# Patient Record
Sex: MALE | Race: WHITE | NOT HISPANIC OR LATINO | Employment: FULL TIME | URBAN - METROPOLITAN AREA
[De-identification: names, ages, dates, MRNs, and addresses within clinical notes are randomized per-mention and may not be internally consistent; named-entity substitution may affect disease eponyms.]

---

## 2017-08-24 DIAGNOSIS — M25.561 RIGHT KNEE PAIN, UNSPECIFIED CHRONICITY: Primary | ICD-10-CM

## 2017-08-25 ENCOUNTER — HOSPITAL ENCOUNTER (OUTPATIENT)
Dept: RADIOLOGY | Facility: HOSPITAL | Age: 62
Discharge: HOME | End: 2017-08-25
Attending: ORTHOPAEDIC SURGERY
Payer: COMMERCIAL

## 2017-08-25 ENCOUNTER — OFFICE VISIT (OUTPATIENT)
Dept: ORTHOPEDICS | Facility: CLINIC | Age: 62
End: 2017-08-25
Payer: COMMERCIAL

## 2017-08-25 VITALS — HEIGHT: 75 IN | BODY MASS INDEX: 32.7 KG/M2 | WEIGHT: 263 LBS

## 2017-08-25 DIAGNOSIS — M25.561 RIGHT KNEE PAIN, UNSPECIFIED CHRONICITY: Primary | ICD-10-CM

## 2017-08-25 DIAGNOSIS — M25.561 RIGHT KNEE PAIN, UNSPECIFIED CHRONICITY: ICD-10-CM

## 2017-08-25 PROCEDURE — 73560 X-RAY EXAM OF KNEE 1 OR 2: CPT | Mod: 26,LT,, | Performed by: RADIOLOGY

## 2017-08-25 PROCEDURE — 99999 PR PBB SHADOW E&M-EST. PATIENT-LVL II: CPT | Mod: PBBFAC,,, | Performed by: ORTHOPAEDIC SURGERY

## 2017-08-25 PROCEDURE — 3008F BODY MASS INDEX DOCD: CPT | Mod: S$GLB,,, | Performed by: ORTHOPAEDIC SURGERY

## 2017-08-25 PROCEDURE — 73560 X-RAY EXAM OF KNEE 1 OR 2: CPT | Mod: TC,PO,LT

## 2017-08-25 PROCEDURE — 73562 X-RAY EXAM OF KNEE 3: CPT | Mod: 26,RT,, | Performed by: RADIOLOGY

## 2017-08-25 PROCEDURE — 99203 OFFICE O/P NEW LOW 30 MIN: CPT | Mod: 25,S$GLB,, | Performed by: ORTHOPAEDIC SURGERY

## 2017-08-25 PROCEDURE — 97760 ORTHOTIC MGMT&TRAING 1ST ENC: CPT | Mod: S$GLB,,, | Performed by: ORTHOPAEDIC SURGERY

## 2017-08-25 RX ORDER — ESCITALOPRAM OXALATE 10 MG/1
TABLET ORAL
Refills: 11 | COMMUNITY
Start: 2017-08-10 | End: 2021-02-18 | Stop reason: SDUPTHER

## 2017-08-25 RX ORDER — GLUCOSAMINE/CHONDRO SU A 500-400 MG
1 TABLET ORAL 3 TIMES DAILY
COMMUNITY

## 2017-08-25 RX ORDER — MELOXICAM 15 MG/1
TABLET ORAL
Refills: 3 | COMMUNITY
Start: 2017-07-12 | End: 2024-03-25

## 2017-08-25 RX ORDER — CLOBETASOL PROPIONATE 0.5 MG/G
CREAM TOPICAL
Refills: 3 | COMMUNITY
Start: 2017-07-12 | End: 2021-02-09

## 2017-08-25 NOTE — PROGRESS NOTES
Mr. Montoya is a 62-year-old complaining of pain in his right knee for about   three days' time.  He spent a long day standing at the [employer] on   [place], which is where he works and then later that day, he did a lot of   mowing of grass where he had to push a lawnmower, noticed pain in his knee   thereafter, feels like his knee wants to give out on and feels fragile, pain   with twisting motions.  Pain is 3/10 on the pain scale on bad days, 0/10 on good   days.    Exam shows full motion, good strength.  Does have joint line tenderness.  No   signs of infection.  No instability.    X-rays show well preserved joint spacing.    ASSESSMENT:  Knee strain chondral injury with knee pain x3 days.    PLAN:  Knee brace, symptomatic care, relative rest.  Follow up in a few weeks'   time to see how things are coming along.      PBB/PN  dd: 08/25/2017 09:42:54 (CDT)  td: 08/25/2017 11:55:07 (CDT)  Doc ID   #[id number]  Job ID #[id number]    CC:     Further History  Aching pain  Worse with activity  Relieved with rest  No other associated symptoms  No other radiation    Further Exam  Alert and oriented  Pleasant  Contralateral limb has appropriate range of motion for age and condition  Contralateral limb has appropriate strength for age and condition  Contralateral limb has appropriate stability  for age and condition  No adenopathy  Pulses are appropriate for current condition  Skin is intact        Chief Complaint    Chief Complaint   Patient presents with    Right Knee - Injury, Pain       HPI  Hank Montoya is a 62 y.o.  male who presents with       Past Medical History  No past medical history on file.    Past Surgical History  No past surgical history on file.    Medications  Current Outpatient Prescriptions   Medication Sig    escitalopram oxalate (LEXAPRO) 10 MG tablet TK 1 T PO  QAM    glucosamine-chondroitin 500-400 mg tablet Take 1 tablet by mouth 3 (three) times daily.    meloxicam (MOBIC) 15 MG tablet  TK 1 T PO QAM AFTER A MEAL FOR FOOT PAIN    clobetasol (TEMOVATE) 0.05 % cream CHRIST AA QD     No current facility-administered medications for this visit.        Allergies  Review of patient's allergies indicates:  No Known Allergies    Family History  No family history on file.    Social History  Social History     Social History    Marital status:      Spouse name: N/A    Number of children: N/A    Years of education: N/A     Occupational History    Not on file.     Social History Main Topics    Smoking status: Never Smoker    Smokeless tobacco: Never Used    Alcohol use Not on file    Drug use: Unknown    Sexual activity: Not on file     Other Topics Concern    Not on file     Social History Narrative    No narrative on file               Review of Systems     Constitutional: Negative    HENT: Negative  Eyes: Negative  Respiratory: Negative  Cardiovascular: Negative  Musculoskeletal: HPI  Skin: Negative  Neurological: Negative  Hematological: Negative  Endocrine: Negative                 Physical Exam    There were no vitals filed for this visit.  Body mass index is 32.87 kg/m².  Physical Examination:     General appearance -  well appearing, and in no distress  Mental status - awake  Neck - supple  Chest -  symmetric air entry  Heart - normal rate   Abdomen - soft      Assessment     1. Right knee pain, unspecified chronicity          PlanWe performed a custom orthotic/brace fitting, adjusting and training with the patient. The patient demonstrated understanding and proper care. This was performed for 15 minutes.

## 2019-10-31 LAB
CHOL/HDLC RATIO: 3.9
CHOLEST SERPL-MSCNC: 155 MG/DL (ref 0–200)
HDLC SERPL-MCNC: 40 MG/DL
LDLC SERPL CALC-MCNC: 84 MG/DL (ref 0–160)
NONHDLC SERPL-MCNC: 115 MG/DL
TRIGL SERPL-MCNC: 226 MG/DL (ref 40–160)

## 2019-11-25 ENCOUNTER — TELEPHONE (OUTPATIENT)
Dept: CARDIOLOGY | Facility: CLINIC | Age: 64
End: 2019-11-25

## 2019-11-25 NOTE — TELEPHONE ENCOUNTER
----- Message from Vanessa Laureano sent at 11/25/2019  9:00 AM CST -----  Contact: PT  Type:  Sooner Apoointment Request    Caller is requesting a sooner appointment.  Caller declined first available appointment listed below.  Caller will not accept being placed on the waitlist and is requesting a message be sent to doctor.    Name of Caller:  Pt  When is the first available appointment?  01/07/2019  Symptoms: second opinion  Best Call Back Number:  [phone]  Additional Information:  Please Advise ---Thank you

## 2019-12-17 ENCOUNTER — OFFICE VISIT (OUTPATIENT)
Dept: CARDIOLOGY | Facility: CLINIC | Age: 64
End: 2019-12-17
Payer: COMMERCIAL

## 2019-12-17 VITALS
DIASTOLIC BLOOD PRESSURE: 81 MMHG | SYSTOLIC BLOOD PRESSURE: 119 MMHG | BODY MASS INDEX: 34.43 KG/M2 | HEIGHT: 74 IN | HEART RATE: 55 BPM | WEIGHT: 268.31 LBS

## 2019-12-17 DIAGNOSIS — E78.5 DYSLIPIDEMIA: ICD-10-CM

## 2019-12-17 DIAGNOSIS — Z91.89 AT RISK FOR CORONARY ARTERY DISEASE: ICD-10-CM

## 2019-12-17 DIAGNOSIS — R94.31 ABNORMAL ECG: ICD-10-CM

## 2019-12-17 PROCEDURE — 99203 OFFICE O/P NEW LOW 30 MIN: CPT | Mod: S$GLB,,, | Performed by: INTERNAL MEDICINE

## 2019-12-17 PROCEDURE — 99999 PR PBB SHADOW E&M-EST. PATIENT-LVL III: ICD-10-PCS | Mod: PBBFAC,,, | Performed by: INTERNAL MEDICINE

## 2019-12-17 PROCEDURE — 99999 PR PBB SHADOW E&M-EST. PATIENT-LVL III: CPT | Mod: PBBFAC,,, | Performed by: INTERNAL MEDICINE

## 2019-12-17 PROCEDURE — 99203 PR OFFICE/OUTPT VISIT, NEW, LEVL III, 30-44 MIN: ICD-10-PCS | Mod: S$GLB,,, | Performed by: INTERNAL MEDICINE

## 2019-12-17 RX ORDER — GABAPENTIN 300 MG/1
300 CAPSULE ORAL 2 TIMES DAILY
COMMUNITY
End: 2021-02-18 | Stop reason: SDUPTHER

## 2019-12-17 RX ORDER — PRAVASTATIN SODIUM 10 MG/1
10 TABLET ORAL DAILY
COMMUNITY
End: 2021-02-18 | Stop reason: SDUPTHER

## 2019-12-17 RX ORDER — METFORMIN HYDROCHLORIDE 500 MG/1
500 TABLET ORAL 2 TIMES DAILY WITH MEALS
COMMUNITY
End: 2021-02-18 | Stop reason: SDUPTHER

## 2019-12-17 RX ORDER — FENOFIBRATE 160 MG/1
160 TABLET ORAL DAILY
COMMUNITY
End: 2021-02-18 | Stop reason: SDUPTHER

## 2019-12-17 NOTE — PROGRESS NOTES
Subjective:    Patient ID:  Hank Montoya is a 64 y.o. male who presents for evaluation of [facility] Care      HPI  Here to establish/transfer care from  concerning DLP/elevated glucose/abn ECG. 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 Mild increase in READ vs 4 months ago. No CP.     Review of Systems   Constitution: Negative for malaise/fatigue.   Eyes: Negative for blurred vision.   Cardiovascular: Negative for chest pain, claudication, cyanosis, dyspnea on exertion, irregular heartbeat, leg swelling, near-syncope, orthopnea, palpitations, paroxysmal nocturnal dyspnea and syncope.   Respiratory: Negative for cough and shortness of breath.    Hematologic/Lymphatic: Does not bruise/bleed easily.   Musculoskeletal: Negative for back pain, falls, joint pain, muscle cramps, muscle weakness and myalgias.   Gastrointestinal: Negative for abdominal pain, change in bowel habit, nausea and vomiting.   Genitourinary: Negative for urgency.   Neurological: Negative for dizziness, focal weakness and light-headedness.       History reviewed. No pertinent past medical history.       Objective:     Vitals:    12/17/19 0912   BP: 119/81   Pulse: (!) 55        Physical Exam   Constitutional: He is oriented to person, place, and time. He appears well-developed and well-nourished. He is cooperative.   HENT:   Head: Normocephalic and atraumatic.   Eyes: Conjunctivae are normal. Right eye exhibits no exudate. Left eye exhibits no exudate.   Neck: Normal range of motion. Neck supple. Normal carotid pulses and no JVD present. Carotid bruit is not present. No thyromegaly present.   Cardiovascular: Normal rate, regular rhythm, normal heart sounds and intact distal pulses.   Pulses:       Carotid pulses are 2+ on the right side, and 2+ on the left side.       Radial pulses are 2+ on the right side, and 2+ on the left side.        Dorsalis pedis pulses are 2+ on the right side, and 2+ on the left side.        Posterior tibial pulses  are 2+ on the right side, and 2+ on the left side.   Pulmonary/Chest: Effort normal and breath sounds normal.   Abdominal: Soft. Bowel sounds are normal.   Musculoskeletal: Normal range of motion. He exhibits no edema.   Neurological: He is alert and oriented to person, place, and time. Gait normal.   Skin: Skin is warm, dry and intact. No cyanosis. Nails show no clubbing.   Psychiatric: He has a normal mood and affect. His speech is normal and behavior is normal. Judgment and thought content normal.   Nursing note and vitals reviewed.          [facility] 10/19  tg 226 ldl 84 hdl 40  hba1c 6.5  ..    Chemistry        Component Value Date/Time     07/21/2006 1208    K 3.8 07/21/2006 1208     07/21/2006 1208    CO2 26 07/21/2006 1208    BUN 12 07/21/2006 1208    CREATININE 1.1 07/21/2006 1208    GLU 88 07/21/2006 1208        Component Value Date/Time    CALCIUM 9.5 07/21/2006 1208    ALKPHOS 80 07/21/2006 1208    AST 19 07/21/2006 1208    ALT 21 07/21/2006 1208    BILITOT 0.4 07/21/2006 1208            ..  Lab Results   Component Value Date    CHOL 164 07/21/2006    CHOL 163 04/08/2004     Lab Results   Component Value Date    HDL 37.0 (L) 07/21/2006    HDL 32.0 (L) 04/08/2004     Lab Results   Component Value Date    LDLCALC 87.8 07/21/2006    LDLCALC 88.8 04/08/2004     Lab Results   Component Value Date    TRIG 196 (H) 07/21/2006    TRIG 211 (H) 04/08/2004     Lab Results   Component Value Date    CHOLHDL 22.6 07/21/2006    CHOLHDL 19.6 (L) 04/08/2004     ..  Lab Results   Component Value Date    WBC 5.89 07/21/2006    HGB 16.4 07/21/2006    HCT 47.1 07/21/2006    MCV 95.7 (H) 07/21/2006     07/21/2006       Test(s) Reviewed  I have reviewed the following in detail:  [] Stress test   [] Angiography   [x] Echocardiogram   [x] Labs   [x] Other:       Assessment:         ICD-10-CM ICD-9-CM   1. Dyslipidemia E78.5 272.4   2. Abnormal ECG R94.31 794.31     Problem List Items Addressed This Visit      Dyslipidemia    Abnormal ECG    Overview     Lateral t wave inversion since 2015                  Plan:           Return to clinic 4 months   Low level/low impact aerobic exercise 5x's/wk. Heart healthy diet and risk factor modification.    See labs and med orders.  Orders Placed This Encounter   Procedures    CT Calcium Scoring Cardiac    Comprehensive metabolic panel    TSH    T4, free    Lipid panel    CBC auto differential    Echo       Portions of this note may have been created with voice recognition software.  Grammatical, syntax and spelling errors may be inevitable.

## 2019-12-24 ENCOUNTER — CLINICAL SUPPORT (OUTPATIENT)
Dept: CARDIOLOGY | Facility: CLINIC | Age: 64
End: 2019-12-24
Attending: INTERNAL MEDICINE
Payer: COMMERCIAL

## 2019-12-24 ENCOUNTER — HOSPITAL ENCOUNTER (OUTPATIENT)
Dept: RADIOLOGY | Facility: HOSPITAL | Age: 64
Discharge: HOME | End: 2019-12-24
Attending: INTERNAL MEDICINE
Payer: COMMERCIAL

## 2019-12-24 VITALS — HEART RATE: 50 BPM | BODY MASS INDEX: 34.39 KG/M2 | HEIGHT: 74 IN | WEIGHT: 268 LBS

## 2019-12-24 DIAGNOSIS — E78.5 DYSLIPIDEMIA: ICD-10-CM

## 2019-12-24 DIAGNOSIS — R94.31 ABNORMAL ECG: ICD-10-CM

## 2019-12-24 DIAGNOSIS — Z91.89 AT RISK FOR CORONARY ARTERY DISEASE: ICD-10-CM

## 2019-12-24 LAB
ASCENDING AORTA: 3.05 CM
AV INDEX (PROSTH): 0.75
AV MEAN GRADIENT: 6 MMHG
AV PEAK GRADIENT: 10 MMHG
AV VALVE AREA: 2.45 CM2
AV VELOCITY RATIO: 0.78
BSA FOR ECHO PROCEDURE: 2.52 M2
CV ECHO LV RWT: 0.36 CM
DOP CALC AO PEAK VEL: 1.56 M/S
DOP CALC AO VTI: 40.27 CM
DOP CALC LVOT AREA: 3.3 CM2
DOP CALC LVOT DIAMETER: 2.04 CM
DOP CALC LVOT PEAK VEL: 1.21 M/S
DOP CALC LVOT STROKE VOLUME: 98.72 CM3
DOP CALCLVOT PEAK VEL VTI: 30.22 CM
E WAVE DECELERATION TIME: 269.01 MSEC
E/A RATIO: 1.08
E/E' RATIO: 10.12 M/S
ECHO LV POSTERIOR WALL: 0.96 CM (ref 0.6–1.1)
FRACTIONAL SHORTENING: 52 % (ref 28–44)
INTERVENTRICULAR SEPTUM: 1.02 CM (ref 0.6–1.1)
IVRT: 0.11 MSEC
LA MAJOR: 5.45 CM
LA MINOR: 5.81 CM
LA WIDTH: 3.63 CM
LEFT ATRIUM SIZE: 3.44 CM
LEFT ATRIUM VOLUME INDEX: 24.3 ML/M2
LEFT ATRIUM VOLUME: 59.7 CM3
LEFT INTERNAL DIMENSION IN SYSTOLE: 2.57 CM (ref 2.1–4)
LEFT VENTRICLE DIASTOLIC VOLUME INDEX: 56.25 ML/M2
LEFT VENTRICLE DIASTOLIC VOLUME: 138.44 ML
LEFT VENTRICLE MASS INDEX: 82 G/M2
LEFT VENTRICLE SYSTOLIC VOLUME INDEX: 9.7 ML/M2
LEFT VENTRICLE SYSTOLIC VOLUME: 23.83 ML
LEFT VENTRICULAR INTERNAL DIMENSION IN DIASTOLE: 5.35 CM (ref 3.5–6)
LEFT VENTRICULAR MASS: 200.86 G
LV LATERAL E/E' RATIO: 8.6 M/S
LV SEPTAL E/E' RATIO: 12.29 M/S
MV PEAK A VEL: 0.8 M/S
MV PEAK E VEL: 0.86 M/S
PISA TR MAX VEL: 1.96 M/S
PULM VEIN S/D RATIO: 1.32
PV PEAK D VEL: 0.41 M/S
PV PEAK S VEL: 0.54 M/S
RA MAJOR: 5.59 CM
RA PRESSURE: 3 MMHG
RA WIDTH: 3.45 CM
RIGHT VENTRICULAR END-DIASTOLIC DIMENSION: 4.54 CM
SINUS: 3.33 CM
STJ: 3.23 CM
TDI LATERAL: 0.1 M/S
TDI SEPTAL: 0.07 M/S
TDI: 0.09 M/S
TR MAX PG: 15 MMHG
TRICUSPID ANNULAR PLANE SYSTOLIC EXCURSION: 2.04 CM
TV REST PULMONARY ARTERY PRESSURE: 18 MMHG

## 2019-12-24 PROCEDURE — 99999 PR PBB SHADOW E&M-EST. PATIENT-LVL I: CPT | Mod: PBBFAC,,,

## 2019-12-24 PROCEDURE — 93306 ECHO (CUPID ONLY): ICD-10-PCS | Mod: S$GLB,,, | Performed by: INTERNAL MEDICINE

## 2019-12-24 PROCEDURE — 75571 CT HRT W/O DYE W/CA TEST: CPT | Mod: 26,,, | Performed by: RADIOLOGY

## 2019-12-24 PROCEDURE — 75571 CT CALCIUM SCORING CARDIAC: ICD-10-PCS | Mod: 26,,, | Performed by: RADIOLOGY

## 2019-12-24 PROCEDURE — 93306 TTE W/DOPPLER COMPLETE: CPT | Mod: S$GLB,,, | Performed by: INTERNAL MEDICINE

## 2019-12-24 PROCEDURE — 75571 CT HRT W/O DYE W/CA TEST: CPT | Mod: TC,PO

## 2019-12-24 PROCEDURE — 99999 PR PBB SHADOW E&M-EST. PATIENT-LVL I: ICD-10-PCS | Mod: PBBFAC,,,

## 2019-12-26 ENCOUNTER — PATIENT MESSAGE (OUTPATIENT)
Dept: CARDIOLOGY | Facility: CLINIC | Age: 64
End: 2019-12-26

## 2019-12-31 ENCOUNTER — PATIENT MESSAGE (OUTPATIENT)
Dept: CARDIOLOGY | Facility: CLINIC | Age: 64
End: 2019-12-31

## 2020-02-06 ENCOUNTER — PATIENT OUTREACH (OUTPATIENT)
Dept: ADMINISTRATIVE | Facility: HOSPITAL | Age: 65
End: 2020-02-06

## 2020-03-09 ENCOUNTER — PATIENT MESSAGE (OUTPATIENT)
Dept: CARDIOLOGY | Facility: CLINIC | Age: 65
End: 2020-03-09

## 2020-05-21 ENCOUNTER — LAB VISIT (OUTPATIENT)
Dept: PRIMARY CARE CLINIC | Facility: CLINIC | Age: 65
End: 2020-05-21
Payer: COMMERCIAL

## 2020-05-21 DIAGNOSIS — R05.9 COUGH: Primary | ICD-10-CM

## 2020-05-21 PROCEDURE — U0003 INFECTIOUS AGENT DETECTION BY NUCLEIC ACID (DNA OR RNA); SEVERE ACUTE RESPIRATORY SYNDROME CORONAVIRUS 2 (SARS-COV-2) (CORONAVIRUS DISEASE [COVID-19]), AMPLIFIED PROBE TECHNIQUE, MAKING USE OF HIGH THROUGHPUT TECHNOLOGIES AS DESCRIBED BY CMS-2020-01-R: HCPCS

## 2020-05-22 LAB — SARS-COV-2 RNA RESP QL NAA+PROBE: NOT DETECTED

## 2020-06-12 ENCOUNTER — LAB VISIT (OUTPATIENT)
Dept: LAB | Facility: HOSPITAL | Age: 65
End: 2020-06-12
Attending: INTERNAL MEDICINE
Payer: COMMERCIAL

## 2020-06-12 DIAGNOSIS — Z91.89 AT RISK FOR CORONARY ARTERY DISEASE: ICD-10-CM

## 2020-06-12 DIAGNOSIS — E78.5 DYSLIPIDEMIA: ICD-10-CM

## 2020-06-12 DIAGNOSIS — R94.31 ABNORMAL ECG: ICD-10-CM

## 2020-06-12 LAB
ALBUMIN SERPL BCP-MCNC: 3.9 G/DL (ref 3.5–5.2)
ALP SERPL-CCNC: 66 U/L (ref 55–135)
ALT SERPL W/O P-5'-P-CCNC: 23 U/L (ref 10–44)
ANION GAP SERPL CALC-SCNC: 11 MMOL/L (ref 8–16)
AST SERPL-CCNC: 21 U/L (ref 10–40)
BASOPHILS # BLD AUTO: 0.03 K/UL (ref 0–0.2)
BASOPHILS NFR BLD: 0.4 % (ref 0–1.9)
BILIRUB SERPL-MCNC: 0.5 MG/DL (ref 0.1–1)
BUN SERPL-MCNC: 16 MG/DL (ref 8–23)
CALCIUM SERPL-MCNC: 9.8 MG/DL (ref 8.7–10.5)
CHLORIDE SERPL-SCNC: 106 MMOL/L (ref 95–110)
CHOLEST SERPL-MCNC: 165 MG/DL (ref 120–199)
CHOLEST/HDLC SERPL: 3.9 {RATIO} (ref 2–5)
CO2 SERPL-SCNC: 24 MMOL/L (ref 23–29)
CREAT SERPL-MCNC: 1.2 MG/DL (ref 0.5–1.4)
DIFFERENTIAL METHOD: ABNORMAL
EOSINOPHIL # BLD AUTO: 0.1 K/UL (ref 0–0.5)
EOSINOPHIL NFR BLD: 1.3 % (ref 0–8)
ERYTHROCYTE [DISTWIDTH] IN BLOOD BY AUTOMATED COUNT: 12.5 % (ref 11.5–14.5)
EST. GFR  (AFRICAN AMERICAN): >60 ML/MIN/1.73 M^2
EST. GFR  (NON AFRICAN AMERICAN): >60 ML/MIN/1.73 M^2
GLUCOSE SERPL-MCNC: 124 MG/DL (ref 70–110)
HCT VFR BLD AUTO: 48.9 % (ref 40–54)
HDLC SERPL-MCNC: 42 MG/DL (ref 40–75)
HDLC SERPL: 25.5 % (ref 20–50)
HGB BLD-MCNC: 15.9 G/DL (ref 14–18)
IMM GRANULOCYTES # BLD AUTO: 0.02 K/UL (ref 0–0.04)
IMM GRANULOCYTES NFR BLD AUTO: 0.3 % (ref 0–0.5)
LDLC SERPL CALC-MCNC: 82.2 MG/DL (ref 63–159)
LYMPHOCYTES # BLD AUTO: 1.9 K/UL (ref 1–4.8)
LYMPHOCYTES NFR BLD: 28.4 % (ref 18–48)
MCH RBC QN AUTO: 33.1 PG (ref 27–31)
MCHC RBC AUTO-ENTMCNC: 32.5 G/DL (ref 32–36)
MCV RBC AUTO: 102 FL (ref 82–98)
MONOCYTES # BLD AUTO: 0.7 K/UL (ref 0.3–1)
MONOCYTES NFR BLD: 10.1 % (ref 4–15)
NEUTROPHILS # BLD AUTO: 4 K/UL (ref 1.8–7.7)
NEUTROPHILS NFR BLD: 59.5 % (ref 38–73)
NONHDLC SERPL-MCNC: 123 MG/DL
NRBC BLD-RTO: 0 /100 WBC
PLATELET # BLD AUTO: 210 K/UL (ref 150–350)
PMV BLD AUTO: 11.4 FL (ref 9.2–12.9)
POTASSIUM SERPL-SCNC: 4.3 MMOL/L (ref 3.5–5.1)
PROT SERPL-MCNC: 7.7 G/DL (ref 6–8.4)
RBC # BLD AUTO: 4.8 M/UL (ref 4.6–6.2)
SODIUM SERPL-SCNC: 141 MMOL/L (ref 136–145)
T4 FREE SERPL-MCNC: 0.79 NG/DL (ref 0.71–1.51)
TRIGL SERPL-MCNC: 204 MG/DL (ref 30–150)
TSH SERPL DL<=0.005 MIU/L-ACNC: 2.64 UIU/ML (ref 0.4–4)
WBC # BLD AUTO: 6.76 K/UL (ref 3.9–12.7)

## 2020-06-12 PROCEDURE — 84443 ASSAY THYROID STIM HORMONE: CPT

## 2020-06-12 PROCEDURE — 80061 LIPID PANEL: CPT

## 2020-06-12 PROCEDURE — 85025 COMPLETE CBC W/AUTO DIFF WBC: CPT

## 2020-06-12 PROCEDURE — 80053 COMPREHEN METABOLIC PANEL: CPT

## 2020-06-12 PROCEDURE — 84439 ASSAY OF FREE THYROXINE: CPT

## 2020-06-12 PROCEDURE — 36415 COLL VENOUS BLD VENIPUNCTURE: CPT | Mod: PO

## 2020-06-18 ENCOUNTER — TELEPHONE (OUTPATIENT)
Dept: CARDIOLOGY | Facility: CLINIC | Age: 65
End: 2020-06-18

## 2020-06-18 NOTE — TELEPHONE ENCOUNTER
----- Message from Wanda Navarro sent at 6/18/2020 10:12 AM CDT -----  Regarding: fitted in  this  month    Type:  Sooner Apoointment Request    Caller is requesting a sooner appointment.  Caller declined first available appointment listed below.  Caller will not accept being placed on the waitlist and is requesting a message be sent to doctor.    Name of Caller:  pt  When is the first available appointment?  Wants to be  fitted in this  month   due to  ins expiring   Symptoms:  [facility]  Best Call Back Number:  [phone]  Additional Information:   pt   is  asikng to  be fitted in

## 2020-07-16 ENCOUNTER — OFFICE VISIT (OUTPATIENT)
Dept: CARDIOLOGY | Facility: CLINIC | Age: 65
End: 2020-07-16
Payer: MEDICARE

## 2020-07-16 VITALS
BODY MASS INDEX: 33.16 KG/M2 | DIASTOLIC BLOOD PRESSURE: 84 MMHG | HEIGHT: 74 IN | WEIGHT: 258.38 LBS | SYSTOLIC BLOOD PRESSURE: 134 MMHG | HEART RATE: 51 BPM

## 2020-07-16 DIAGNOSIS — E78.5 DYSLIPIDEMIA: ICD-10-CM

## 2020-07-16 DIAGNOSIS — R94.31 ABNORMAL ECG: ICD-10-CM

## 2020-07-16 DIAGNOSIS — E78.5 HYPERLIPIDEMIA, UNSPECIFIED HYPERLIPIDEMIA TYPE: Primary | ICD-10-CM

## 2020-07-16 DIAGNOSIS — E78.5 DYSLIPIDEMIA: Primary | ICD-10-CM

## 2020-07-16 PROCEDURE — 99213 OFFICE O/P EST LOW 20 MIN: CPT | Mod: PBBFAC,PO | Performed by: PHYSICIAN ASSISTANT

## 2020-07-16 PROCEDURE — 99214 OFFICE O/P EST MOD 30 MIN: CPT | Mod: S$PBB,,, | Performed by: PHYSICIAN ASSISTANT

## 2020-07-16 PROCEDURE — 99999 PR PBB SHADOW E&M-EST. PATIENT-LVL III: ICD-10-PCS | Mod: PBBFAC,,, | Performed by: PHYSICIAN ASSISTANT

## 2020-07-16 PROCEDURE — 99999 PR PBB SHADOW E&M-EST. PATIENT-LVL III: CPT | Mod: PBBFAC,,, | Performed by: PHYSICIAN ASSISTANT

## 2020-07-16 PROCEDURE — 99214 PR OFFICE/OUTPT VISIT, EST, LEVL IV, 30-39 MIN: ICD-10-PCS | Mod: S$PBB,,, | Performed by: PHYSICIAN ASSISTANT

## 2020-07-16 NOTE — PROGRESS NOTES
"Subjective:    Patient ID:  Hank Montoya is a 65 y.o. male who presents for follow-up of HLD.       HPI  Mr. Montoya is a pleasant gentleman who follows with Dr. Tom. He has a hx of mild-moderate AI and HLD. He had a coronary calcium score last December that was 0. He is without cardiovascular complaints today. No CP, READ, or change in exercise tolerance. He has started exercising more recently.     Review of Systems   Constitution: Negative for chills, diaphoresis, fever, weight gain and weight loss.   HENT: Negative for sore throat.    Eyes: Negative for blurred vision, vision loss in left eye, vision loss in right eye and visual disturbance.   Cardiovascular: Negative for chest pain, claudication, dyspnea on exertion, leg swelling, near-syncope, orthopnea, palpitations, paroxysmal nocturnal dyspnea and syncope.   Respiratory: Negative for cough, hemoptysis, shortness of breath, sputum production and wheezing.    Endocrine: Negative for cold intolerance and heat intolerance.   Hematologic/Lymphatic: Negative for adenopathy. Does not bruise/bleed easily.   Skin: Negative for rash.   Musculoskeletal: Negative for falls, muscle weakness and myalgias.   Gastrointestinal: Negative for abdominal pain, change in bowel habit, constipation, diarrhea, melena and nausea.   Genitourinary: Negative for bladder incontinence.   Neurological: Negative for dizziness, focal weakness, headaches, light-headedness, numbness and weakness.   Psychiatric/Behavioral: Negative for altered mental status.         Vitals:    07/16/20 1352   BP: 134/84   BP Location: Right arm   Patient Position: Sitting   BP Method: Large (Automatic)   Pulse: (!) 51   Weight: 117.2 kg (258 lb 6.1 oz)   Height: 6' 2" (1.88 m)     Body mass index is 33.17 kg/m².     Objective:    Physical Exam   Constitutional: He is oriented to person, place, and time. He appears well-developed and well-nourished.   HENT:   Head: Normocephalic and atraumatic.   Eyes: " Pupils are equal, round, and reactive to light. EOM are normal.   Neck: Neck supple. No JVD present. No tracheal deviation present. No thyromegaly present.   Cardiovascular: Normal rate, regular rhythm, S1 normal, S2 normal, intact distal pulses and normal pulses. PMI is not displaced. Exam reveals no gallop and no friction rub.   No murmur heard.  Pulmonary/Chest: Effort normal and breath sounds normal. No respiratory distress. He has no wheezes. He has no rales. He exhibits no tenderness.   Abdominal: Soft. Bowel sounds are normal. He exhibits no distension and no mass. There is no abdominal tenderness.   Musculoskeletal: Normal range of motion.         General: No tenderness or edema.   Neurological: He is alert and oriented to person, place, and time.   Skin: Skin is warm and dry. No rash noted.   Psychiatric: He has a normal mood and affect. His behavior is normal.         Assessment:          ICD-10-CM ICD-9-CM   1. Dyslipidemia E78.5 272.4   2. Abnormal ECG R94.31 794.31          Problem List Items Addressed This Visit           Dyslipidemia      Abnormal ECG      Overview        Lateral t wave inversion since 2015                    Plan:       Continue current cardiac medications.   Discussed diet and exercise.   F/U with Dr. Tom in 1 year with labs and echo.

## 2021-01-15 ENCOUNTER — TELEPHONE (OUTPATIENT)
Dept: FAMILY MEDICINE | Facility: CLINIC | Age: 66
End: 2021-01-15

## 2021-01-22 ENCOUNTER — PATIENT MESSAGE (OUTPATIENT)
Dept: ADMINISTRATIVE | Facility: OTHER | Age: 66
End: 2021-01-22

## 2021-02-09 ENCOUNTER — HOSPITAL ENCOUNTER (OUTPATIENT)
Dept: RADIOLOGY | Facility: HOSPITAL | Age: 66
Discharge: HOME | End: 2021-02-09
Attending: FAMILY MEDICINE
Payer: MEDICARE

## 2021-02-09 ENCOUNTER — OFFICE VISIT (OUTPATIENT)
Dept: FAMILY MEDICINE | Facility: CLINIC | Age: 66
End: 2021-02-09
Payer: MEDICARE

## 2021-02-09 VITALS
WEIGHT: 263.25 LBS | HEIGHT: 74 IN | OXYGEN SATURATION: 99 % | SYSTOLIC BLOOD PRESSURE: 138 MMHG | BODY MASS INDEX: 33.79 KG/M2 | HEART RATE: 78 BPM | DIASTOLIC BLOOD PRESSURE: 84 MMHG | TEMPERATURE: 98 F

## 2021-02-09 DIAGNOSIS — E11.9 TYPE 2 DIABETES MELLITUS WITHOUT COMPLICATION, WITHOUT LONG-TERM CURRENT USE OF INSULIN: ICD-10-CM

## 2021-02-09 DIAGNOSIS — F32.A DEPRESSION, UNSPECIFIED DEPRESSION TYPE: ICD-10-CM

## 2021-02-09 DIAGNOSIS — E78.5 DYSLIPIDEMIA: ICD-10-CM

## 2021-02-09 DIAGNOSIS — Z23 IMMUNIZATION DUE: ICD-10-CM

## 2021-02-09 DIAGNOSIS — Z00.00 ENCOUNTER FOR MEDICAL EXAMINATION TO ESTABLISH CARE: Primary | ICD-10-CM

## 2021-02-09 DIAGNOSIS — Z86.19 HISTORY OF HISTOPLASMOSIS: ICD-10-CM

## 2021-02-09 DIAGNOSIS — Z12.5 SCREENING FOR PROSTATE CANCER: ICD-10-CM

## 2021-02-09 DIAGNOSIS — Z12.11 COLON CANCER SCREENING: ICD-10-CM

## 2021-02-09 PROCEDURE — 71046 X-RAY EXAM CHEST 2 VIEWS: CPT | Mod: 26,,, | Performed by: RADIOLOGY

## 2021-02-09 PROCEDURE — 99999 PR PBB SHADOW E&M-EST. PATIENT-LVL IV: CPT | Mod: PBBFAC,,, | Performed by: FAMILY MEDICINE

## 2021-02-09 PROCEDURE — 99203 OFFICE O/P NEW LOW 30 MIN: CPT | Mod: S$PBB,,, | Performed by: FAMILY MEDICINE

## 2021-02-09 PROCEDURE — 99999 PR PBB SHADOW E&M-EST. PATIENT-LVL IV: ICD-10-PCS | Mod: PBBFAC,,, | Performed by: FAMILY MEDICINE

## 2021-02-09 PROCEDURE — 71046 XR CHEST PA AND LATERAL: ICD-10-PCS | Mod: 26,,, | Performed by: RADIOLOGY

## 2021-02-09 PROCEDURE — 99214 OFFICE O/P EST MOD 30 MIN: CPT | Mod: PBBFAC,25,PN | Performed by: FAMILY MEDICINE

## 2021-02-09 PROCEDURE — 99203 PR OFFICE/OUTPT VISIT, NEW, LEVL III, 30-44 MIN: ICD-10-PCS | Mod: S$PBB,,, | Performed by: FAMILY MEDICINE

## 2021-02-09 PROCEDURE — 71046 X-RAY EXAM CHEST 2 VIEWS: CPT | Mod: TC,PN

## 2021-02-10 ENCOUNTER — PATIENT MESSAGE (OUTPATIENT)
Dept: FAMILY MEDICINE | Facility: CLINIC | Age: 66
End: 2021-02-10

## 2021-02-10 DIAGNOSIS — Z12.11 COLON CANCER SCREENING: ICD-10-CM

## 2021-02-17 ENCOUNTER — PATIENT MESSAGE (OUTPATIENT)
Dept: FAMILY MEDICINE | Facility: CLINIC | Age: 66
End: 2021-02-17

## 2021-02-18 ENCOUNTER — TELEPHONE (OUTPATIENT)
Dept: FAMILY MEDICINE | Facility: CLINIC | Age: 66
End: 2021-02-18

## 2021-02-18 RX ORDER — ESCITALOPRAM OXALATE 10 MG/1
10 TABLET ORAL DAILY
Qty: 90 TABLET | Refills: 3 | Status: SHIPPED | OUTPATIENT
Start: 2021-02-18 | End: 2022-01-04 | Stop reason: SDUPTHER

## 2021-02-18 RX ORDER — FENOFIBRATE 160 MG/1
160 TABLET ORAL DAILY
Qty: 90 TABLET | Refills: 3 | Status: SHIPPED | OUTPATIENT
Start: 2021-02-18 | End: 2022-01-04 | Stop reason: SDUPTHER

## 2021-02-18 RX ORDER — PRAVASTATIN SODIUM 10 MG/1
10 TABLET ORAL DAILY
Qty: 90 TABLET | Refills: 3 | Status: SHIPPED | OUTPATIENT
Start: 2021-02-18 | End: 2021-02-22 | Stop reason: DRUGHIGH

## 2021-02-18 RX ORDER — GABAPENTIN 300 MG/1
300 CAPSULE ORAL DAILY
Qty: 180 CAPSULE | Refills: 3 | Status: SHIPPED | OUTPATIENT
Start: 2021-02-18 | End: 2022-03-30

## 2021-02-18 RX ORDER — METFORMIN HYDROCHLORIDE 500 MG/1
500 TABLET ORAL 2 TIMES DAILY WITH MEALS
Qty: 180 TABLET | Refills: 3 | Status: SHIPPED | OUTPATIENT
Start: 2021-02-18 | End: 2021-02-22 | Stop reason: DRUGHIGH

## 2021-02-22 ENCOUNTER — PATIENT OUTREACH (OUTPATIENT)
Dept: ADMINISTRATIVE | Facility: HOSPITAL | Age: 66
End: 2021-02-22

## 2021-02-22 ENCOUNTER — PATIENT MESSAGE (OUTPATIENT)
Dept: FAMILY MEDICINE | Facility: CLINIC | Age: 66
End: 2021-02-22

## 2021-02-22 RX ORDER — PRAVASTATIN SODIUM 40 MG/1
40 TABLET ORAL DAILY
COMMUNITY
End: 2021-02-22 | Stop reason: SDUPTHER

## 2021-02-22 RX ORDER — METFORMIN HYDROCHLORIDE 500 MG/1
500 TABLET ORAL DAILY
COMMUNITY
End: 2021-02-22 | Stop reason: SDUPTHER

## 2021-02-23 RX ORDER — METFORMIN HYDROCHLORIDE 500 MG/1
500 TABLET ORAL DAILY
Qty: 90 TABLET | Refills: 2 | Status: SHIPPED | OUTPATIENT
Start: 2021-02-23 | End: 2022-03-30

## 2021-02-23 RX ORDER — PRAVASTATIN SODIUM 40 MG/1
40 TABLET ORAL DAILY
Qty: 90 TABLET | Refills: 2 | Status: SHIPPED | OUTPATIENT
Start: 2021-02-23 | End: 2021-10-15

## 2021-02-24 ENCOUNTER — PATIENT MESSAGE (OUTPATIENT)
Dept: FAMILY MEDICINE | Facility: CLINIC | Age: 66
End: 2021-02-24

## 2021-02-25 ENCOUNTER — PATIENT MESSAGE (OUTPATIENT)
Dept: FAMILY MEDICINE | Facility: CLINIC | Age: 66
End: 2021-02-25

## 2021-07-16 ENCOUNTER — HOSPITAL ENCOUNTER (OUTPATIENT)
Dept: CARDIOLOGY | Facility: HOSPITAL | Age: 66
Discharge: HOME | End: 2021-07-16
Attending: PHYSICIAN ASSISTANT
Payer: MEDICARE

## 2021-07-16 VITALS — BODY MASS INDEX: 33.75 KG/M2 | WEIGHT: 263 LBS | HEIGHT: 74 IN

## 2021-07-16 DIAGNOSIS — E78.5 DYSLIPIDEMIA: ICD-10-CM

## 2021-07-16 DIAGNOSIS — R94.31 ABNORMAL ECG: ICD-10-CM

## 2021-07-16 LAB
ASCENDING AORTA: 3.42 CM
AV INDEX (PROSTH): 0.99
AV MEAN GRADIENT: 5 MMHG
AV PEAK GRADIENT: 8 MMHG
AV VALVE AREA: 3.9 CM2
AV VELOCITY RATIO: 0.92
BSA FOR ECHO PROCEDURE: 2.5 M2
CV ECHO LV RWT: 0.42 CM
DOP CALC AO PEAK VEL: 1.42 M/S
DOP CALC AO VTI: 36.28 CM
DOP CALC LVOT AREA: 3.9 CM2
DOP CALC LVOT DIAMETER: 2.24 CM
DOP CALC LVOT PEAK VEL: 1.31 M/S
DOP CALC LVOT STROKE VOLUME: 141.48 CM3
DOP CALCLVOT PEAK VEL VTI: 35.92 CM
E WAVE DECELERATION TIME: 240.3 MSEC
E/A RATIO: 1.31
E/E' RATIO: 10.47 M/S
ECHO LV POSTERIOR WALL: 1.11 CM (ref 0.6–1.1)
EJECTION FRACTION: 65 %
FRACTIONAL SHORTENING: 36 % (ref 28–44)
INTERVENTRICULAR SEPTUM: 1.09 CM (ref 0.6–1.1)
LA MAJOR: 5.87 CM
LA MINOR: 5.97 CM
LA WIDTH: 4.83 CM
LEFT ATRIUM SIZE: 4.02 CM
LEFT ATRIUM VOLUME INDEX: 40 ML/M2
LEFT ATRIUM VOLUME: 97.7 CM3
LEFT INTERNAL DIMENSION IN SYSTOLE: 3.39 CM (ref 2.1–4)
LEFT VENTRICLE DIASTOLIC VOLUME INDEX: 54.44 ML/M2
LEFT VENTRICLE DIASTOLIC VOLUME: 132.84 ML
LEFT VENTRICLE MASS INDEX: 92 G/M2
LEFT VENTRICLE SYSTOLIC VOLUME INDEX: 19.4 ML/M2
LEFT VENTRICLE SYSTOLIC VOLUME: 47.24 ML
LEFT VENTRICULAR INTERNAL DIMENSION IN DIASTOLE: 5.26 CM (ref 3.5–6)
LEFT VENTRICULAR MASS: 224.93 G
LV LATERAL E/E' RATIO: 8.9 M/S
LV SEPTAL E/E' RATIO: 12.71 M/S
MV A" WAVE DURATION": 14.08 MSEC
MV PEAK A VEL: 0.68 M/S
MV PEAK E VEL: 0.89 M/S
PISA TR MAX VEL: 2.48 M/S
PULM VEIN S/D RATIO: 1.13
PV PEAK D VEL: 0.39 M/S
PV PEAK S VEL: 0.44 M/S
RA MAJOR: 5.5 CM
RA PRESSURE: 3 MMHG
RA WIDTH: 3.76 CM
RIGHT VENTRICULAR END-DIASTOLIC DIMENSION: 3.73 CM
RV TISSUE DOPPLER FREE WALL SYSTOLIC VELOCITY 1 (APICAL 4 CHAMBER VIEW): 12.62 CM/S
SINUS: 3.23 CM
STJ: 3.19 CM
TDI LATERAL: 0.1 M/S
TDI SEPTAL: 0.07 M/S
TDI: 0.09 M/S
TR MAX PG: 25 MMHG
TRICUSPID ANNULAR PLANE SYSTOLIC EXCURSION: 3.1 CM
TV REST PULMONARY ARTERY PRESSURE: 28 MMHG

## 2021-07-16 PROCEDURE — 93306 TTE W/DOPPLER COMPLETE: CPT | Mod: PO

## 2021-07-16 PROCEDURE — 93306 ECHO (CUPID ONLY): ICD-10-PCS | Mod: 26,,, | Performed by: INTERNAL MEDICINE

## 2021-07-16 PROCEDURE — 93306 TTE W/DOPPLER COMPLETE: CPT | Mod: 26,,, | Performed by: INTERNAL MEDICINE

## 2021-08-05 ENCOUNTER — OFFICE VISIT (OUTPATIENT)
Dept: CARDIOLOGY | Facility: CLINIC | Age: 66
End: 2021-08-05
Attending: PHYSICIAN ASSISTANT
Payer: MEDICARE

## 2021-08-05 VITALS
HEIGHT: 74 IN | BODY MASS INDEX: 33.02 KG/M2 | OXYGEN SATURATION: 98 % | SYSTOLIC BLOOD PRESSURE: 128 MMHG | HEART RATE: 56 BPM | DIASTOLIC BLOOD PRESSURE: 80 MMHG | WEIGHT: 257.25 LBS

## 2021-08-05 DIAGNOSIS — E78.5 DYSLIPIDEMIA: ICD-10-CM

## 2021-08-05 DIAGNOSIS — I35.1 NONRHEUMATIC AORTIC VALVE INSUFFICIENCY: ICD-10-CM

## 2021-08-05 DIAGNOSIS — E11.9 TYPE 2 DIABETES MELLITUS WITHOUT COMPLICATION, WITHOUT LONG-TERM CURRENT USE OF INSULIN: Primary | ICD-10-CM

## 2021-08-05 PROCEDURE — 99999 PR PBB SHADOW E&M-EST. PATIENT-LVL III: CPT | Mod: PBBFAC,,, | Performed by: PHYSICIAN ASSISTANT

## 2021-08-05 PROCEDURE — 99999 PR PBB SHADOW E&M-EST. PATIENT-LVL III: ICD-10-PCS | Mod: PBBFAC,,, | Performed by: PHYSICIAN ASSISTANT

## 2021-08-05 PROCEDURE — 99213 OFFICE O/P EST LOW 20 MIN: CPT | Mod: PBBFAC,PO | Performed by: PHYSICIAN ASSISTANT

## 2021-08-05 PROCEDURE — 99214 OFFICE O/P EST MOD 30 MIN: CPT | Mod: S$PBB,,, | Performed by: PHYSICIAN ASSISTANT

## 2021-08-05 PROCEDURE — 99214 PR OFFICE/OUTPT VISIT, EST, LEVL IV, 30-39 MIN: ICD-10-PCS | Mod: S$PBB,,, | Performed by: PHYSICIAN ASSISTANT

## 2021-09-15 DIAGNOSIS — E11.9 TYPE 2 DIABETES MELLITUS WITHOUT COMPLICATION: ICD-10-CM

## 2021-10-04 ENCOUNTER — PATIENT MESSAGE (OUTPATIENT)
Dept: FAMILY MEDICINE | Facility: CLINIC | Age: 66
End: 2021-10-04

## 2021-10-06 ENCOUNTER — IMMUNIZATION (OUTPATIENT)
Dept: FAMILY MEDICINE | Facility: CLINIC | Age: 66
End: 2021-10-06
Payer: MEDICARE

## 2021-10-06 DIAGNOSIS — Z23 NEED FOR VACCINATION: Primary | ICD-10-CM

## 2021-10-06 DIAGNOSIS — E11.9 TYPE 2 DIABETES MELLITUS WITHOUT COMPLICATION, UNSPECIFIED WHETHER LONG TERM INSULIN USE: ICD-10-CM

## 2021-10-06 PROCEDURE — 0003A COVID-19, MRNA, LNP-S, PF, 30 MCG/0.3 ML DOSE VACCINE: CPT | Mod: PBBFAC | Performed by: FAMILY MEDICINE

## 2021-10-06 PROCEDURE — 91300 COVID-19, MRNA, LNP-S, PF, 30 MCG/0.3 ML DOSE VACCINE: CPT | Mod: PBBFAC,PO

## 2021-10-07 ENCOUNTER — OFFICE VISIT (OUTPATIENT)
Dept: FAMILY MEDICINE | Facility: CLINIC | Age: 66
End: 2021-10-07
Payer: MEDICARE

## 2021-10-07 ENCOUNTER — LAB VISIT (OUTPATIENT)
Dept: LAB | Facility: HOSPITAL | Age: 66
End: 2021-10-07
Attending: NURSE PRACTITIONER
Payer: MEDICARE

## 2021-10-07 ENCOUNTER — IMMUNIZATION (OUTPATIENT)
Dept: FAMILY MEDICINE | Facility: CLINIC | Age: 66
End: 2021-10-07
Payer: MEDICARE

## 2021-10-07 VITALS
WEIGHT: 255.75 LBS | SYSTOLIC BLOOD PRESSURE: 130 MMHG | OXYGEN SATURATION: 96 % | DIASTOLIC BLOOD PRESSURE: 80 MMHG | HEIGHT: 74 IN | HEART RATE: 63 BPM | BODY MASS INDEX: 32.82 KG/M2

## 2021-10-07 DIAGNOSIS — E11.42 TYPE 2 DIABETES MELLITUS WITH DIABETIC POLYNEUROPATHY, WITHOUT LONG-TERM CURRENT USE OF INSULIN: Primary | ICD-10-CM

## 2021-10-07 DIAGNOSIS — Z23 NEED FOR PNEUMOCOCCAL VACCINATION: ICD-10-CM

## 2021-10-07 DIAGNOSIS — E11.42 TYPE 2 DIABETES MELLITUS WITH DIABETIC POLYNEUROPATHY, WITHOUT LONG-TERM CURRENT USE OF INSULIN: ICD-10-CM

## 2021-10-07 LAB
ESTIMATED AVG GLUCOSE: 123 MG/DL (ref 68–131)
HBA1C MFR BLD: 5.9 % (ref 4–5.6)

## 2021-10-07 PROCEDURE — G0008 ADMIN INFLUENZA VIRUS VAC: HCPCS | Mod: PBBFAC,PO

## 2021-10-07 PROCEDURE — 99999 PR PBB SHADOW E&M-EST. PATIENT-LVL III: CPT | Mod: PBBFAC,,, | Performed by: NURSE PRACTITIONER

## 2021-10-07 PROCEDURE — 99214 OFFICE O/P EST MOD 30 MIN: CPT | Mod: S$PBB,,, | Performed by: NURSE PRACTITIONER

## 2021-10-07 PROCEDURE — 83036 HEMOGLOBIN GLYCOSYLATED A1C: CPT | Performed by: NURSE PRACTITIONER

## 2021-10-07 PROCEDURE — G0009 ADMIN PNEUMOCOCCAL VACCINE: HCPCS | Mod: PBBFAC,PO

## 2021-10-07 PROCEDURE — 36415 COLL VENOUS BLD VENIPUNCTURE: CPT | Mod: PO | Performed by: NURSE PRACTITIONER

## 2021-10-07 PROCEDURE — 90694 VACC AIIV4 NO PRSRV 0.5ML IM: CPT | Mod: PBBFAC,PO

## 2021-10-07 PROCEDURE — 99214 PR OFFICE/OUTPT VISIT, EST, LEVL IV, 30-39 MIN: ICD-10-PCS | Mod: S$PBB,,, | Performed by: NURSE PRACTITIONER

## 2021-10-07 PROCEDURE — 99213 OFFICE O/P EST LOW 20 MIN: CPT | Mod: PBBFAC,PO | Performed by: NURSE PRACTITIONER

## 2021-10-07 PROCEDURE — 99999 PR PBB SHADOW E&M-EST. PATIENT-LVL III: ICD-10-PCS | Mod: PBBFAC,,, | Performed by: NURSE PRACTITIONER

## 2021-10-07 PROCEDURE — 90732 PPSV23 VACC 2 YRS+ SUBQ/IM: CPT | Mod: PBBFAC,PO

## 2021-10-07 RX ORDER — GABAPENTIN 100 MG/1
CAPSULE ORAL
Qty: 90 CAPSULE | Refills: 1 | Status: SHIPPED | OUTPATIENT
Start: 2021-10-07 | End: 2022-03-29 | Stop reason: SDUPTHER

## 2021-12-08 LAB
LEFT EYE DM RETINOPATHY: NEGATIVE
RIGHT EYE DM RETINOPATHY: NEGATIVE

## 2021-12-20 ENCOUNTER — PATIENT OUTREACH (OUTPATIENT)
Dept: ADMINISTRATIVE | Facility: HOSPITAL | Age: 66
End: 2021-12-20
Payer: MEDICARE

## 2022-01-04 RX ORDER — FENOFIBRATE 160 MG/1
160 TABLET ORAL DAILY
Qty: 90 TABLET | Refills: 0 | Status: SHIPPED | OUTPATIENT
Start: 2022-01-04 | End: 2022-04-27

## 2022-01-04 RX ORDER — ESCITALOPRAM OXALATE 10 MG/1
10 TABLET ORAL DAILY
Qty: 90 TABLET | Refills: 0 | Status: SHIPPED | OUTPATIENT
Start: 2022-01-04 | End: 2022-04-27

## 2022-01-04 NOTE — TELEPHONE ENCOUNTER
----- Message from Alvaro Main sent at 1/4/2022 10:14 AM CST -----  Type:  RX Refill Request    Who Called:  Danilo[employer]  Refill or New Rx: Refill  RX Name and Strength:    EScitalopram oxalate (LEXAPRO) 10 MG tablet  1XDay, 90 days    fenofibrate 160 MG Tab  1XDay, 90 days    Preferred Pharmacy with phone number:        [id number] - [place], [place] - [address] [address] [address]  [address] [address] [facility] [address] [facility] [address]  Phone: [phone] Fax: [phone]      Local or Mail Order: Local  Ordering Provider:  Tono Isbell Call Back Number: [phone]  Additional Information:

## 2022-01-04 NOTE — TELEPHONE ENCOUNTER
Refill Authorization Note   Hank Montoya  is requesting a refill authorization.  Brief Assessment and Rationale for Refill:  Approve     Medication Therapy Plan:       Medication Reconciliation Completed: No   Comments:   --->Care Gap information included below if applicable.   Orders Placed This Encounter    EScitalopram oxalate (LEXAPRO) 10 MG tablet    fenofibrate 160 MG Tab      Requested Prescriptions   Signed Prescriptions Disp Refills    EScitalopram oxalate (LEXAPRO) 10 MG tablet 90 tablet 0     Sig: Take 1 tablet (10 mg total) by mouth once daily.       Psychiatry:  Antidepressants - SSRI Passed - 1/4/2022 12:49 PM        Passed - Patient is at least 18 years old        Passed - Valid encounter within last 15 months     Recent Visits  Date Type Provider Dept   02/09/21 Office Visit Ke Power MD [facility]   Showing recent visits within past 720 days and meeting all other requirements  Future Appointments  No visits were found meeting these conditions.  Showing future appointments within next 150 days and meeting all other requirements      Future Appointments              In 6 days MD [facility] Jones  Cardiology [facility]                  fenofibrate 160 MG Tab 90 tablet 0     Sig: Take 1 tablet (160 mg total) by mouth once daily.       Cardiovascular:  Antilipid - Fibric Acid Derivatives Passed - 1/4/2022 12:49 PM        Passed - Patient is at least 18 years old        Passed - Valid encounter within last 15 months     Recent Visits  Date Type Provider Dept   02/09/21 Office Visit Ke Power MD [facility]   Showing recent visits within past 720 days and meeting all other requirements  Future Appointments  No visits were found meeting these conditions.  Showing future appointments within next 150 days and meeting all other requirements      Future Appointments              In 6 days MD [facility] Jones  Cardiology, [facility]                Passed -  ALT is 119 or below and within 360 days     ALT   Date Value Ref Range Status   07/16/2021 18 10 - 44 U/L Final   02/09/2021 27 10 - 44 U/L Final   06/12/2020 23 10 - 44 U/L Final              Passed - AST is 131 or below and within 360 days     AST   Date Value Ref Range Status   07/16/2021 20 10 - 40 U/L Final   02/09/2021 26 10 - 40 U/L Final   06/12/2020 21 10 - 40 U/L Final              Passed - Cr is 1.39 or below and within 360 days     Lab Results   Component Value Date    CREATININE 1.1 07/16/2021    CREATININE 1.1 02/09/2021    CREATININE 1.2 06/12/2020              Passed - eGFR is 30 or above and within 360 days     Lab Results   Component Value Date    EGFRNONAA >60.0 07/16/2021    EGFRNONAA >60.0 02/09/2021    EGFRNONAA >60.0 06/12/2020                Passed - WBC within 360 days     WBC   Date Value Ref Range Status   07/16/2021 6.77 3.90 - 12.70 K/uL Final   06/12/2020 6.76 3.90 - 12.70 K/uL Final   07/21/2006 5.89 4.8 - 10.8 K/uL Final              Passed - Total Cholesterol within 360 days     Lab Results   Component Value Date    CHOL 155 07/16/2021    CHOL 168 02/09/2021    CHOL 165 06/12/2020              Passed - LDL within 360 days     LDL Calculated   Date Value Ref Range Status   10/31/2019 84 0 - 160 MG/DL Final     LDL Cholesterol   Date Value Ref Range Status   07/16/2021 80.2 63.0 - 159.0 mg/dL Final     Comment:     The National Cholesterol Education Program (NCEP) has set the  following guidelines (reference values) for LDL Cholesterol:  Optimal.......................<130 mg/dL  Borderline High...............130-159 mg/dL  High..........................160-189 mg/dL  Very High.....................>190 mg/dL              Passed - HDL within 360 days     HDL   Date Value Ref Range Status   07/16/2021 41 40 - 75 mg/dL Final     Comment:     The National Cholesterol Education Program (NCEP) has set the  following guidelines (reference values) for HDL Cholesterol:  Low...............<40  mg/dL  Optimal...........>60 mg/dL     10/31/2019 40 mg/dL Final            Passed - Triglycerides within 360 days     Lab Results   Component Value Date    TRIG 169 (H) 07/16/2021    TRIG 252 (H) 02/09/2021    TRIG 204 (H) 06/12/2020                  Appointments  past 12m or future 3m with PCP    Date Provider   Last Visit   2/9/2021 Ke Power MD   Next Visit   Visit date not found Ke Power MD   ED visits in past 90 days: 0     Note composed:3:29 PM 01/04/2022

## 2022-01-05 ENCOUNTER — PATIENT OUTREACH (OUTPATIENT)
Dept: ADMINISTRATIVE | Facility: OTHER | Age: 67
End: 2022-01-05
Payer: MEDICARE

## 2022-01-10 ENCOUNTER — OFFICE VISIT (OUTPATIENT)
Dept: CARDIOLOGY | Facility: CLINIC | Age: 67
End: 2022-01-10
Payer: MEDICARE

## 2022-01-10 VITALS
SYSTOLIC BLOOD PRESSURE: 147 MMHG | BODY MASS INDEX: 33.3 KG/M2 | HEART RATE: 54 BPM | DIASTOLIC BLOOD PRESSURE: 85 MMHG | HEIGHT: 74 IN | WEIGHT: 259.5 LBS

## 2022-01-10 DIAGNOSIS — R94.31 ABNORMAL ECG: ICD-10-CM

## 2022-01-10 DIAGNOSIS — Z86.19 HISTORY OF HISTOPLASMOSIS: ICD-10-CM

## 2022-01-10 DIAGNOSIS — E78.5 DYSLIPIDEMIA: ICD-10-CM

## 2022-01-10 DIAGNOSIS — I35.1 NONRHEUMATIC AORTIC VALVE INSUFFICIENCY: Primary | ICD-10-CM

## 2022-01-10 DIAGNOSIS — E11.9 TYPE 2 DIABETES MELLITUS WITHOUT COMPLICATION, WITHOUT LONG-TERM CURRENT USE OF INSULIN: ICD-10-CM

## 2022-01-10 PROCEDURE — 99213 OFFICE O/P EST LOW 20 MIN: CPT | Mod: PBBFAC,PO | Performed by: INTERNAL MEDICINE

## 2022-01-10 PROCEDURE — 99214 OFFICE O/P EST MOD 30 MIN: CPT | Mod: S$PBB,,, | Performed by: INTERNAL MEDICINE

## 2022-01-10 PROCEDURE — 99999 PR PBB SHADOW E&M-EST. PATIENT-LVL III: CPT | Mod: PBBFAC,,, | Performed by: INTERNAL MEDICINE

## 2022-01-10 PROCEDURE — 99999 PR PBB SHADOW E&M-EST. PATIENT-LVL III: ICD-10-PCS | Mod: PBBFAC,,, | Performed by: INTERNAL MEDICINE

## 2022-01-10 PROCEDURE — 99214 PR OFFICE/OUTPT VISIT, EST, LEVL IV, 30-39 MIN: ICD-10-PCS | Mod: S$PBB,,, | Performed by: INTERNAL MEDICINE

## 2022-01-10 NOTE — PROGRESS NOTES
Subjective:    Patient ID:  Hank Montoya is a 66 y.o. male who presents for follow-up of No chief complaint on file.      HPI  Here for follow up of AI/DLP/elevated glucose/abn ECG. Remains very active no change in exertional tolerance.     Review of Systems   Constitutional: Negative for malaise/fatigue.   Eyes: Negative for blurred vision.   Cardiovascular: Negative for chest pain, claudication, cyanosis, dyspnea on exertion, irregular heartbeat, leg swelling, near-syncope, orthopnea, palpitations, paroxysmal nocturnal dyspnea and syncope.   Respiratory: Negative for cough and shortness of breath.    Hematologic/Lymphatic: Does not bruise/bleed easily.   Musculoskeletal: Negative for back pain, falls, joint pain, muscle cramps, muscle weakness and myalgias.   Gastrointestinal: Negative for abdominal pain, change in bowel habit, nausea and vomiting.   Genitourinary: Negative for urgency.   Neurological: Negative for dizziness, focal weakness and light-headedness.       Past Medical History:   Diagnosis Date    Depression     Diabetes mellitus, type 2     Hyperlipidemia      There are no hospital problems to display for this patient.       Objective:     Vitals:    01/10/22 0857   BP: (!) 147/85   Pulse: (!) 54        Physical Exam  Vitals and nursing note reviewed.   Constitutional:       Appearance: He is well-developed and well-nourished.   Neck:      Vascular: No JVD.   Cardiovascular:      Rate and Rhythm: Normal rate and regular rhythm.      Pulses: Intact distal pulses.           Carotid pulses are 2+ on the right side and 2+ on the left side.       Radial pulses are 2+ on the right side and 2+ on the left side.      Heart sounds: Normal heart sounds.   Pulmonary:      Effort: Pulmonary effort is normal.      Breath sounds: Normal breath sounds.   Musculoskeletal:         General: No edema. Normal range of motion.      Cervical back: Normal range of motion and neck supple.   Skin:     General: Skin is  warm and dry.   Neurological:      Mental Status: He is alert and oriented to person, place, and time.   Psychiatric:         Mood and Affect: Mood and affect normal.         Speech: Speech normal.         Cognition and Memory: Cognition and memory normal.               ..    Chemistry        Component Value Date/Time     07/16/2021 0805    K 4.7 07/16/2021 0805     07/16/2021 0805    CO2 26 07/16/2021 0805    BUN 18 07/16/2021 0805    CREATININE 1.1 07/16/2021 0805     (H) 07/16/2021 0805        Component Value Date/Time    CALCIUM 9.8 07/16/2021 0805    ALKPHOS 57 07/16/2021 0805    AST 20 07/16/2021 0805    ALT 18 07/16/2021 0805    BILITOT 0.6 07/16/2021 0805    ESTGFRAFRICA >60.0 07/16/2021 0805    EGFRNONAA >60.0 07/16/2021 0805            ..  Lab Results   Component Value Date    CHOL 155 07/16/2021    CHOL 168 02/09/2021    CHOL 165 06/12/2020     Lab Results   Component Value Date    HDL 41 07/16/2021    HDL 41 02/09/2021    HDL 42 06/12/2020     Lab Results   Component Value Date    LDLCALC 80.2 07/16/2021    LDLCALC 76.6 02/09/2021    LDLCALC 82.2 06/12/2020     Lab Results   Component Value Date    TRIG 169 (H) 07/16/2021    TRIG 252 (H) 02/09/2021    TRIG 204 (H) 06/12/2020     Lab Results   Component Value Date    CHOLHDL 26.5 07/16/2021    CHOLHDL 24.4 02/09/2021    CHOLHDL 25.5 06/12/2020     ..  Lab Results   Component Value Date    WBC 6.77 07/16/2021    HGB 15.9 07/16/2021    HCT 48.3 07/16/2021     (H) 07/16/2021     07/16/2021       Test(s) Reviewed  I have reviewed the following in detail:  [] Stress test   [] Angiography   [x] Echocardiogram   [x] Labs   [] Other:       Assessment:         ICD-10-CM ICD-9-CM   1. Nonrheumatic aortic valve insufficiency  I35.1 424.1   2. Abnormal ECG  R94.31 794.31   3. Dyslipidemia  E78.5 272.4   4. Type 2 diabetes mellitus without complication, without long-term current use of insulin  E11.9 250.00   5. History of  histoplasmosis  Z86.19 V12.09     Active Problem List with Overview Notes    Diagnosis Date Noted    Nonrheumatic aortic valve insufficiency 08/05/2021    Type 2 diabetes mellitus without complication, without long-term current use of insulin 02/09/2021    Depression 02/09/2021    History of histoplasmosis 02/09/2021    Dyslipidemia 12/17/2019    Abnormal ECG 12/17/2019     Lateral t wave inversion since 2015            Plan:           Return to clinic 1 year   Low level/low impact aerobic exercise 5x's/wk. Heart healthy diet and risk factor modification.    See labs and med orders.        Portions of this note may have been created with voice recognition software.  Grammatical, syntax and spelling errors may be inevitable.

## 2022-03-29 DIAGNOSIS — E11.42 TYPE 2 DIABETES MELLITUS WITH DIABETIC POLYNEUROPATHY, WITHOUT LONG-TERM CURRENT USE OF INSULIN: ICD-10-CM

## 2022-03-29 RX ORDER — GABAPENTIN 100 MG/1
CAPSULE ORAL
Qty: 90 CAPSULE | Refills: 0 | OUTPATIENT
Start: 2022-03-29

## 2022-03-29 RX ORDER — GABAPENTIN 100 MG/1
CAPSULE ORAL
Qty: 90 CAPSULE | Refills: 1 | Status: SHIPPED | OUTPATIENT
Start: 2022-03-29 | End: 2022-07-29

## 2022-03-29 NOTE — TELEPHONE ENCOUNTER
Care Due:                  Date            Visit Type   Department     Provider  --------------------------------------------------------------------------------                                NP -                              PRIMARY      [facility]  Last Visit: 02-      CARE ([facility])   MEDICINE       Ke Power  Next Visit: None Scheduled  None         None Found                                                            Last  Test          Frequency    Reason                     Performed    Due Date  --------------------------------------------------------------------------------    Office Visit  12 months..  EScitalopram,              02- 02-                             fenofibrate, metFORMIN,                             pravastatin..............    HBA1C.......  6 months...  metFORMIN................  10-   04-    Powered by [employer] by [employer]. Reference number: [id number].   3/29/2022 10:18:53 AM CDT

## 2022-03-30 RX ORDER — GABAPENTIN 300 MG/1
CAPSULE ORAL
Qty: 90 CAPSULE | Refills: 3 | Status: SHIPPED | OUTPATIENT
Start: 2022-03-30 | End: 2023-05-09 | Stop reason: SDUPTHER

## 2022-03-30 RX ORDER — METFORMIN HYDROCHLORIDE 500 MG/1
TABLET ORAL
Qty: 90 TABLET | Refills: 3 | Status: SHIPPED | OUTPATIENT
Start: 2022-03-30 | End: 2023-05-09 | Stop reason: SDUPTHER

## 2022-04-27 RX ORDER — FENOFIBRATE 160 MG/1
TABLET ORAL
Qty: 90 TABLET | Refills: 0 | Status: SHIPPED | OUTPATIENT
Start: 2022-04-27 | End: 2022-07-29

## 2022-04-27 RX ORDER — ESCITALOPRAM OXALATE 10 MG/1
TABLET ORAL
Qty: 90 TABLET | Refills: 0 | Status: SHIPPED | OUTPATIENT
Start: 2022-04-27 | End: 2022-07-29

## 2022-04-27 NOTE — TELEPHONE ENCOUNTER
Care Due:                  Date            Visit Type   Department     Provider  --------------------------------------------------------------------------------                                NP -                              PRIMARY      [facility]  Last Visit: 02-      CARE ([facility])   MEDICINE       Ke Power  Next Visit: None Scheduled  None         None Found                                                            Last  Test          Frequency    Reason                     Performed    Due Date  --------------------------------------------------------------------------------    CBC.........  12 months..  fenofibrate..............  07- 07-    CMP.........  12 months..  fenofibrate, metFORMIN,    07- 07-                             pravastatin..............    Lipid Panel.  12 months..  fenofibrate, pravastatin.  07- 07-    Powered by [employer] by Triage. Reference number: [id number].   4/27/2022 10:16:08 AM CDT

## 2022-04-27 NOTE — TELEPHONE ENCOUNTER
Refill Authorization Note   Hank Bookerjai  is requesting a refill authorization.  Brief Assessment and Rationale for Refill:  Approve     Medication Therapy Plan:       Medication Reconciliation Completed: No   Comments:     No Care Gaps recommended.     Note composed:3:05 PM 04/27/2022

## 2022-05-02 ENCOUNTER — PATIENT MESSAGE (OUTPATIENT)
Dept: FAMILY MEDICINE | Facility: CLINIC | Age: 67
End: 2022-05-02
Payer: MEDICARE

## 2022-05-02 DIAGNOSIS — Z79.899 DRUG THERAPY: Primary | ICD-10-CM

## 2022-05-02 DIAGNOSIS — Z12.5 ENCOUNTER FOR SCREENING FOR MALIGNANT NEOPLASM OF PROSTATE: ICD-10-CM

## 2022-05-23 ENCOUNTER — PATIENT OUTREACH (OUTPATIENT)
Dept: ADMINISTRATIVE | Facility: HOSPITAL | Age: 67
End: 2022-05-23
Payer: MEDICARE

## 2022-05-23 NOTE — PROGRESS NOTES
2022 Care Everywhere updates requested and reviewed.  Immunizations reconciled. Media reports reviewed.  Duplicate HM overrides and  orders removed.  Overdue HM topic chart audit and/or requested.  Overdue lab testing linked to upcoming lab appointments if applies.  Labs scheduled 2022    Health Maintenance Due   Topic Date Due    Diabetes Urine Screening  2022    Hemoglobin A1c  2022

## 2022-06-02 ENCOUNTER — LAB VISIT (OUTPATIENT)
Dept: LAB | Facility: HOSPITAL | Age: 67
End: 2022-06-02
Attending: FAMILY MEDICINE
Payer: MEDICARE

## 2022-06-02 DIAGNOSIS — Z12.5 ENCOUNTER FOR SCREENING FOR MALIGNANT NEOPLASM OF PROSTATE: ICD-10-CM

## 2022-06-02 DIAGNOSIS — Z79.899 DRUG THERAPY: ICD-10-CM

## 2022-06-02 LAB
ALBUMIN SERPL BCP-MCNC: 3.9 G/DL (ref 3.5–5.2)
ALP SERPL-CCNC: 49 U/L (ref 55–135)
ALT SERPL W/O P-5'-P-CCNC: 24 U/L (ref 10–44)
ANION GAP SERPL CALC-SCNC: 8 MMOL/L (ref 8–16)
AST SERPL-CCNC: 20 U/L (ref 10–40)
BASOPHILS # BLD AUTO: 0.02 K/UL (ref 0–0.2)
BASOPHILS NFR BLD: 0.3 % (ref 0–1.9)
BILIRUB SERPL-MCNC: 0.9 MG/DL (ref 0.1–1)
BUN SERPL-MCNC: 20 MG/DL (ref 8–23)
CALCIUM SERPL-MCNC: 9.6 MG/DL (ref 8.7–10.5)
CHLORIDE SERPL-SCNC: 103 MMOL/L (ref 95–110)
CHOLEST SERPL-MCNC: 163 MG/DL (ref 120–199)
CHOLEST/HDLC SERPL: 4.4 {RATIO} (ref 2–5)
CO2 SERPL-SCNC: 26 MMOL/L (ref 23–29)
COMPLEXED PSA SERPL-MCNC: 3.7 NG/ML (ref 0–4)
CREAT SERPL-MCNC: 1 MG/DL (ref 0.5–1.4)
DIFFERENTIAL METHOD: ABNORMAL
EOSINOPHIL # BLD AUTO: 0.1 K/UL (ref 0–0.5)
EOSINOPHIL NFR BLD: 1.6 % (ref 0–8)
ERYTHROCYTE [DISTWIDTH] IN BLOOD BY AUTOMATED COUNT: 12 % (ref 11.5–14.5)
EST. GFR  (AFRICAN AMERICAN): >60 ML/MIN/1.73 M^2
EST. GFR  (NON AFRICAN AMERICAN): >60 ML/MIN/1.73 M^2
ESTIMATED AVG GLUCOSE: 123 MG/DL (ref 68–131)
GLUCOSE SERPL-MCNC: 104 MG/DL (ref 70–110)
HBA1C MFR BLD: 5.9 % (ref 4–5.6)
HCT VFR BLD AUTO: 48.6 % (ref 40–54)
HDLC SERPL-MCNC: 37 MG/DL (ref 40–75)
HDLC SERPL: 22.7 % (ref 20–50)
HGB BLD-MCNC: 16 G/DL (ref 14–18)
IMM GRANULOCYTES # BLD AUTO: 0.02 K/UL (ref 0–0.04)
IMM GRANULOCYTES NFR BLD AUTO: 0.3 % (ref 0–0.5)
LDLC SERPL CALC-MCNC: 85 MG/DL (ref 63–159)
LYMPHOCYTES # BLD AUTO: 2 K/UL (ref 1–4.8)
LYMPHOCYTES NFR BLD: 27.4 % (ref 18–48)
MCH RBC QN AUTO: 33.7 PG (ref 27–31)
MCHC RBC AUTO-ENTMCNC: 32.9 G/DL (ref 32–36)
MCV RBC AUTO: 102 FL (ref 82–98)
MONOCYTES # BLD AUTO: 0.7 K/UL (ref 0.3–1)
MONOCYTES NFR BLD: 9.1 % (ref 4–15)
NEUTROPHILS # BLD AUTO: 4.5 K/UL (ref 1.8–7.7)
NEUTROPHILS NFR BLD: 61.3 % (ref 38–73)
NONHDLC SERPL-MCNC: 126 MG/DL
NRBC BLD-RTO: 0 /100 WBC
PLATELET # BLD AUTO: 227 K/UL (ref 150–450)
PMV BLD AUTO: 11.6 FL (ref 9.2–12.9)
POTASSIUM SERPL-SCNC: 4 MMOL/L (ref 3.5–5.1)
PROT SERPL-MCNC: 7.6 G/DL (ref 6–8.4)
RBC # BLD AUTO: 4.75 M/UL (ref 4.6–6.2)
SODIUM SERPL-SCNC: 137 MMOL/L (ref 136–145)
T4 FREE SERPL-MCNC: 0.69 NG/DL (ref 0.71–1.51)
TRIGL SERPL-MCNC: 205 MG/DL (ref 30–150)
TSH SERPL DL<=0.005 MIU/L-ACNC: 2.39 UIU/ML (ref 0.4–4)
WBC # BLD AUTO: 7.34 K/UL (ref 3.9–12.7)

## 2022-06-02 PROCEDURE — 83036 HEMOGLOBIN GLYCOSYLATED A1C: CPT | Performed by: FAMILY MEDICINE

## 2022-06-02 PROCEDURE — 36415 COLL VENOUS BLD VENIPUNCTURE: CPT | Mod: PN | Performed by: FAMILY MEDICINE

## 2022-06-02 PROCEDURE — 84439 ASSAY OF FREE THYROXINE: CPT | Performed by: FAMILY MEDICINE

## 2022-06-02 PROCEDURE — 85025 COMPLETE CBC W/AUTO DIFF WBC: CPT | Performed by: FAMILY MEDICINE

## 2022-06-02 PROCEDURE — 80053 COMPREHEN METABOLIC PANEL: CPT | Performed by: FAMILY MEDICINE

## 2022-06-02 PROCEDURE — 80061 LIPID PANEL: CPT | Performed by: FAMILY MEDICINE

## 2022-06-02 PROCEDURE — 84153 ASSAY OF PSA TOTAL: CPT | Performed by: FAMILY MEDICINE

## 2022-06-02 PROCEDURE — 84443 ASSAY THYROID STIM HORMONE: CPT | Performed by: FAMILY MEDICINE

## 2022-06-09 ENCOUNTER — OFFICE VISIT (OUTPATIENT)
Dept: FAMILY MEDICINE | Facility: CLINIC | Age: 67
End: 2022-06-09
Payer: MEDICARE

## 2022-06-09 VITALS
BODY MASS INDEX: 32.78 KG/M2 | RESPIRATION RATE: 18 BRPM | HEIGHT: 74 IN | HEART RATE: 68 BPM | WEIGHT: 255.38 LBS | DIASTOLIC BLOOD PRESSURE: 74 MMHG | OXYGEN SATURATION: 97 % | SYSTOLIC BLOOD PRESSURE: 138 MMHG

## 2022-06-09 DIAGNOSIS — E11.69 DYSLIPIDEMIA ASSOCIATED WITH TYPE 2 DIABETES MELLITUS: ICD-10-CM

## 2022-06-09 DIAGNOSIS — R41.3 MEMORY IMPAIRMENT: ICD-10-CM

## 2022-06-09 DIAGNOSIS — Z86.19 HISTORY OF HISTOPLASMOSIS: ICD-10-CM

## 2022-06-09 DIAGNOSIS — I15.2 HYPERTENSION ASSOCIATED WITH DIABETES: ICD-10-CM

## 2022-06-09 DIAGNOSIS — E11.59 HYPERTENSION ASSOCIATED WITH DIABETES: ICD-10-CM

## 2022-06-09 DIAGNOSIS — R97.20 ELEVATED PSA MEASUREMENT: Primary | ICD-10-CM

## 2022-06-09 DIAGNOSIS — L21.9 SEBORRHEIC DERMATITIS: ICD-10-CM

## 2022-06-09 DIAGNOSIS — E11.42 TYPE 2 DIABETES MELLITUS WITH DIABETIC POLYNEUROPATHY, WITHOUT LONG-TERM CURRENT USE OF INSULIN: ICD-10-CM

## 2022-06-09 DIAGNOSIS — E78.5 DYSLIPIDEMIA ASSOCIATED WITH TYPE 2 DIABETES MELLITUS: ICD-10-CM

## 2022-06-09 PROCEDURE — 99214 OFFICE O/P EST MOD 30 MIN: CPT | Mod: S$PBB,,, | Performed by: FAMILY MEDICINE

## 2022-06-09 PROCEDURE — 99214 OFFICE O/P EST MOD 30 MIN: CPT | Mod: PBBFAC,PN | Performed by: FAMILY MEDICINE

## 2022-06-09 PROCEDURE — 99214 PR OFFICE/OUTPT VISIT, EST, LEVL IV, 30-39 MIN: ICD-10-PCS | Mod: S$PBB,,, | Performed by: FAMILY MEDICINE

## 2022-06-09 PROCEDURE — 99999 PR PBB SHADOW E&M-EST. PATIENT-LVL IV: CPT | Mod: PBBFAC,,, | Performed by: FAMILY MEDICINE

## 2022-06-09 PROCEDURE — 99999 PR PBB SHADOW E&M-EST. PATIENT-LVL IV: ICD-10-PCS | Mod: PBBFAC,,, | Performed by: FAMILY MEDICINE

## 2022-06-09 RX ORDER — KETOCONAZOLE 20 MG/ML
SHAMPOO, SUSPENSION TOPICAL
Qty: 120 ML | Refills: 2 | Status: SHIPPED | OUTPATIENT
Start: 2022-06-09 | End: 2023-09-05 | Stop reason: SDUPTHER

## 2022-06-09 NOTE — PROGRESS NOTES
THIS DOCUMENT WAS MADE IN PART WITH VOICE RECOGNITION SOFTWARE.  OCCASIONALLY THIS SOFTWARE WILL MISINTERPRET WORDS OR PHRASES.    Assessment and Plan:    1. Elevated PSA measurement  PSA, TOTAL AND FREE   2. Seborrheic dermatitis  ketoconazole (NIZORAL) 2 % shampoo   3. Memory impairment  Vitamin B12   4. Type 2 diabetes mellitus with diabetic polyneuropathy, without long-term current use of insulin     5. Dyslipidemia associated with type 2 diabetes mellitus     6. Hypertension associated with diabetes     7. History of histoplasmosis         PLAN    Check B12 with PSA in about 3-4 months.  Referral to Urology if elevated, replete B12 if necessary    New medication ketoconazole shampoo twice weekly, recommended Selsun Blue in between.    MMSE at follow-up visit to investigate memory impairment    Other chronic conditions appear to be well controlled      ______________________________________________________________________  Subjective:    Chief Complaint:  Chief Complaint   Patient presents with    Annual Exam     Small eruptions on the back of the scalp. Sx for 6-8 months. PSA discussion- was the value good?        HPI:  Hank is a 66 y.o. year old     Patient presents for annual exam    Chronic conditions reviewed, see bolded text below.  He does maintain follow-up with Cardiology, denies any exertional chest symptoms.    He has 3 main concerns today    Seborrheic dermatitis-complains of scalp flaking and irritation with ulcer formation.  On exam it appears to be seborrheic dermatitis    Concerned about mild memory impairment.  Has trouble finding words at times.  No significant dysfunction in his daily today life.  No family history of dementia    He does note that his PSA levels have trended upward.  Most recent 3.7.  Does admit to frequent urinary urgency with some mild incontinence at times    Chronic Med Issues Below ===================    Dyslipidemia  Rx-pravastatin 10 mg, fenofibrate 160  mg  Cardiology - Dr. Tom   Denies exertional chest symptoms, fairly active with yard work       Type 2 diabetes mellitus  Rx-Glucophage 500 mg  Previous A1c 5.9%  Currently on statin drug     Depression  Rx-Lexapro 10 mg  Participated in counseling  Medication working well      Bilateral Femoral Lateral Cutaneous Neuropathy  Gabapentin 300 + 100 mg daily, controls symptoms well      ? History of Histoplasmosis Infection   Lung / Eye   Dx : about 20 - 25 years ago   Initially : eye started bleeding   Reports recent abnormal PFT; possibly related to scarring from infection   Denies any SOB / Wheeze     Past Medical History:  Past Medical History:   Diagnosis Date    Depression     Diabetes mellitus, type 2     Hyperlipidemia        Past Surgical History:  Past Surgical History:   Procedure Laterality Date    APPENDECTOMY  1965    HERNIA REPAIR  2000       Family History:  Family History   Problem Relation Age of Onset    Alcohol abuse Mother     Depression Mother     Early death Mother     Diabetes Paternal Uncle     Diabetes Father     Diabetes Paternal Uncle        Social History:  Social History     Socioeconomic History    Marital status:    Tobacco Use    Smoking status: Never Smoker    Smokeless tobacco: Never Used   Substance and Sexual Activity    Alcohol use: Not Currently     Alcohol/week: 1.0 standard drink     Types: 1 Cans of beer per week    Drug use: Never    Sexual activity: Not Currently     Partners: Female     Birth control/protection: Abstinence     Comment: Not sexually active       Medications:  Current Outpatient Medications on File Prior to Visit   Medication Sig Dispense Refill    EScitalopram oxalate (LEXAPRO) 10 MG tablet Take 1 tablet by mouth once daily 90 tablet 0    fenofibrate 160 MG Tab Take 1 tablet by mouth once daily 90 tablet 0    gabapentin (NEURONTIN) 100 MG capsule Take 1 capsule qhs. Increase to 300mg as needed 90 capsule 1    gabapentin (NEURONTIN)  300 MG capsule Take 1 capsule by mouth once daily 90 capsule 3    glucosamine-chondroitin 500-400 mg tablet Take 1 tablet by mouth 3 (three) times daily.      meloxicam (MOBIC) 15 MG tablet TK 1 T PO QAM AFTER A MEAL FOR FOOT PAIN  3    metFORMIN (GLUCOPHAGE) 500 MG tablet Take 1 tablet by mouth once daily 90 tablet 3    pravastatin (PRAVACHOL) 40 MG tablet Take 1 tablet by mouth once daily 90 tablet 3     No current facility-administered medications on file prior to visit.       Allergies:  Thimerosal    Immunizations:  Immunization History   Administered Date(s) Administered    COVID-19, MRNA, LN-S, PF (Pfizer) (Gray Cap) 04/22/2022    COVID-19, MRNA, LN-S, PF (Pfizer) (Purple Cap) 02/25/2021, 03/18/2021, 10/06/2021    Influenza 01/01/2021    Influenza (FLUAD) - Quadrivalent - Adjuvanted - PF *Preferred* (65+) 10/07/2021    Influenza - Quadrivalent - PF *Preferred* (6 months and older) 11/06/2018, 12/18/2020    Pneumococcal Conjugate - 13 Valent 12/01/2020, 12/18/2020    Pneumococcal Polysaccharide - 23 Valent 10/07/2021    Tdap 08/05/2021    Zoster Recombinant 10/16/2021, 02/25/2022       Review of Systems:  Review of Systems   Constitutional: Negative for activity change and unexpected weight change.   HENT: Negative for hearing loss, rhinorrhea and trouble swallowing.    Eyes: Negative for discharge and visual disturbance.   Respiratory: Negative for chest tightness and wheezing.    Cardiovascular: Negative for chest pain and palpitations.   Gastrointestinal: Negative for blood in stool, constipation, diarrhea and vomiting.   Endocrine: Negative for polydipsia and polyuria.   Genitourinary: Positive for urgency. Negative for difficulty urinating and hematuria.   Musculoskeletal: Negative for arthralgias, joint swelling and neck pain.   Neurological: Negative for weakness and headaches.   Psychiatric/Behavioral: Positive for confusion. Negative for dysphoric mood.   All other systems reviewed and  "are negative.      Objective:    Vitals:  Vitals:    06/09/22 1548   BP: 138/74   Pulse: 68   Resp: 18   SpO2: 97%   Weight: 115.9 kg (255 lb 6.5 oz)   Height: 6' 2" (1.88 m)   PainSc: 0-No pain       Physical Exam  Vitals reviewed.   Constitutional:       General: He is not in acute distress.  HENT:      Head: Normocephalic and atraumatic.   Eyes:      Pupils: Pupils are equal, round, and reactive to light.   Cardiovascular:      Rate and Rhythm: Normal rate and regular rhythm.      Heart sounds: No murmur heard.    No friction rub.   Pulmonary:      Effort: Pulmonary effort is normal.      Breath sounds: Normal breath sounds.   Abdominal:      General: Bowel sounds are normal. There is no distension.      Palpations: Abdomen is soft.      Tenderness: There is no abdominal tenderness.   Musculoskeletal:      Cervical back: Neck supple.   Skin:     General: Skin is warm and dry.      Findings: No rash.          Psychiatric:         Behavior: Behavior normal.             Ke Power MD  Family Medicine      "

## 2022-07-20 ENCOUNTER — PATIENT MESSAGE (OUTPATIENT)
Dept: FAMILY MEDICINE | Facility: CLINIC | Age: 67
End: 2022-07-20
Payer: MEDICARE

## 2022-07-27 NOTE — TELEPHONE ENCOUNTER
No new care gaps identified.  [facility] Embedded Care Gaps. Reference number: [id number]. 7/27/2022   4:02:06 PM CDT

## 2022-07-29 RX ORDER — FENOFIBRATE 160 MG/1
TABLET ORAL
Qty: 90 TABLET | Refills: 3 | Status: SHIPPED | OUTPATIENT
Start: 2022-07-29 | End: 2023-12-29

## 2022-07-29 RX ORDER — ESCITALOPRAM OXALATE 10 MG/1
TABLET ORAL
Qty: 90 TABLET | Refills: 3 | Status: SHIPPED | OUTPATIENT
Start: 2022-07-29 | End: 2023-09-30

## 2022-07-29 NOTE — TELEPHONE ENCOUNTER
Refill Decision Note   Hank Lindsay  is requesting a refill authorization.  Brief Assessment and Rationale for Refill:  Approve     Medication Therapy Plan:       Medication Reconciliation Completed: No   Comments:     No Care Gaps recommended.     Note composed:7:01 AM 07/29/2022

## 2022-10-25 ENCOUNTER — CLINICAL SUPPORT (OUTPATIENT)
Dept: PRIMARY CARE CLINIC | Facility: CLINIC | Age: 67
End: 2022-10-25
Payer: MEDICARE

## 2022-10-25 DIAGNOSIS — Z23 NEED FOR INFLUENZA VACCINATION: Primary | ICD-10-CM

## 2022-10-25 PROCEDURE — 90653 IIV ADJUVANT VACCINE IM: CPT | Mod: PBBFAC,PN

## 2022-10-25 PROCEDURE — G0008 ADMIN INFLUENZA VIRUS VAC: HCPCS | Mod: PBBFAC,PN

## 2022-11-09 ENCOUNTER — LAB VISIT (OUTPATIENT)
Dept: LAB | Facility: HOSPITAL | Age: 67
End: 2022-11-09
Attending: FAMILY MEDICINE
Payer: MEDICARE

## 2022-11-09 DIAGNOSIS — R41.3 MEMORY IMPAIRMENT: ICD-10-CM

## 2022-11-09 DIAGNOSIS — R97.20 ELEVATED PSA MEASUREMENT: ICD-10-CM

## 2022-11-09 DIAGNOSIS — E11.9 TYPE 2 DIABETES MELLITUS WITHOUT COMPLICATION: ICD-10-CM

## 2022-11-09 LAB
ESTIMATED AVG GLUCOSE: 126 MG/DL (ref 68–131)
HBA1C MFR BLD: 6 % (ref 4–5.6)
PROSTATE SPECIFIC ANTIGEN, TOTAL: 2.9 NG/ML (ref 0–4)
PSA FREE MFR SERPL: 33.1 %
PSA FREE SERPL-MCNC: 0.96 NG/ML (ref 0–1.5)
VIT B12 SERPL-MCNC: 393 PG/ML (ref 210–950)

## 2022-11-09 PROCEDURE — 82607 VITAMIN B-12: CPT | Performed by: FAMILY MEDICINE

## 2022-11-09 PROCEDURE — 83036 HEMOGLOBIN GLYCOSYLATED A1C: CPT | Performed by: FAMILY MEDICINE

## 2022-11-09 PROCEDURE — 84154 ASSAY OF PSA FREE: CPT | Performed by: FAMILY MEDICINE

## 2022-11-09 PROCEDURE — 84153 ASSAY OF PSA TOTAL: CPT | Performed by: FAMILY MEDICINE

## 2022-11-09 PROCEDURE — 36415 COLL VENOUS BLD VENIPUNCTURE: CPT | Mod: PN | Performed by: FAMILY MEDICINE

## 2022-11-11 ENCOUNTER — OFFICE VISIT (OUTPATIENT)
Dept: FAMILY MEDICINE | Facility: CLINIC | Age: 67
End: 2022-11-11
Payer: MEDICARE

## 2022-11-11 VITALS
OXYGEN SATURATION: 97 % | SYSTOLIC BLOOD PRESSURE: 130 MMHG | WEIGHT: 254.63 LBS | DIASTOLIC BLOOD PRESSURE: 78 MMHG | HEIGHT: 74 IN | HEART RATE: 58 BPM | BODY MASS INDEX: 32.68 KG/M2

## 2022-11-11 DIAGNOSIS — L21.9 SEBORRHEIC DERMATITIS: Primary | ICD-10-CM

## 2022-11-11 PROCEDURE — 99999 PR PBB SHADOW E&M-EST. PATIENT-LVL III: CPT | Mod: PBBFAC,,, | Performed by: FAMILY MEDICINE

## 2022-11-11 PROCEDURE — 99213 PR OFFICE/OUTPT VISIT, EST, LEVL III, 20-29 MIN: ICD-10-PCS | Mod: S$PBB,,, | Performed by: FAMILY MEDICINE

## 2022-11-11 PROCEDURE — 99999 PR PBB SHADOW E&M-EST. PATIENT-LVL III: ICD-10-PCS | Mod: PBBFAC,,, | Performed by: FAMILY MEDICINE

## 2022-11-11 PROCEDURE — 99213 OFFICE O/P EST LOW 20 MIN: CPT | Mod: S$PBB,,, | Performed by: FAMILY MEDICINE

## 2022-11-11 PROCEDURE — 99213 OFFICE O/P EST LOW 20 MIN: CPT | Mod: PBBFAC,PN | Performed by: FAMILY MEDICINE

## 2022-11-11 RX ORDER — CLOBETASOL PROPIONATE 0.46 MG/ML
SOLUTION TOPICAL 2 TIMES DAILY
Qty: 50 ML | Refills: 2 | Status: SHIPPED | OUTPATIENT
Start: 2022-11-11 | End: 2024-01-23

## 2022-11-11 RX ORDER — AZITHROMYCIN 250 MG/1
TABLET, FILM COATED ORAL
COMMUNITY
Start: 2022-07-22 | End: 2024-01-23

## 2022-11-11 RX ORDER — PROMETHAZINE HYDROCHLORIDE AND DEXTROMETHORPHAN HYDROBROMIDE 6.25; 15 MG/5ML; MG/5ML
SYRUP ORAL
COMMUNITY
Start: 2022-07-22 | End: 2024-01-23

## 2022-11-11 NOTE — PROGRESS NOTES
THIS DOCUMENT WAS MADE IN PART WITH VOICE RECOGNITION SOFTWARE.  OCCASIONALLY THIS SOFTWARE WILL MISINTERPRET WORDS OR PHRASES.    Assessment and Plan:    1. Seborrheic dermatitis  clobetasoL (TEMOVATE) 0.05 % external solution          PLAN    Add topical steroid     Other chronic conditions appear to be well controlled           ______________________________________________________________________  Subjective:    Chief Complaint:  Chief Complaint   Patient presents with    Follow-up     Pt was dealing with a fungal infection in the scalp and wants it to be looked at.         HPI:  Hank is a 67 y.o. year old       Follow-up seborrheic dermatitis   Prescribed ketoconazole shampoo, recommended Selsun blue in between   Reports some improvement but not having any resolution    Elevated PSA   Previous lab visit   Normalized since then   Denies any lower urinary tract symptoms          Dyslipidemia  Rx-pravastatin 10 mg, fenofibrate 160 mg  Cardiology - Dr. Tom   Denies exertional chest symptoms, fairly active with yard work       Type 2 diabetes mellitus  Rx-Glucophage 500 mg  Previous A1c 5.9%  Currently on statin drug     Depression  Rx-Lexapro 10 mg  Participated in counseling  Medication working well      Bilateral Femoral Lateral Cutaneous Neuropathy  Gabapentin 300 + 100 mg daily, controls symptoms well      ? History of Histoplasmosis Infection   Lung / Eye   Dx : about 20 - 25 years ago   Initially : eye started bleeding   Reports recent abnormal PFT; possibly related to scarring from infection   Denies any SOB / Wheeze     Past Medical History:  Past Medical History:   Diagnosis Date    Depression     Diabetes mellitus, type 2     Hyperlipidemia        Past Surgical History:  Past Surgical History:   Procedure Laterality Date    APPENDECTOMY  1965    HERNIA REPAIR  2000       Family History:  Family History   Problem Relation Age of Onset    Alcohol abuse Mother     Depression Mother     Early death Mother      Diabetes Paternal Uncle     Diabetes Father     Diabetes Paternal Uncle        Social History:  Social History     Socioeconomic History    Marital status:    Tobacco Use    Smoking status: Never    Smokeless tobacco: Never   Substance and Sexual Activity    Alcohol use: Not Currently     Alcohol/week: 1.0 standard drink     Types: 1 Cans of beer per week    Drug use: Never    Sexual activity: Not Currently     Partners: Female     Birth control/protection: Abstinence     Comment: Not sexually active       Medications:  Current Outpatient Medications on File Prior to Visit   Medication Sig Dispense Refill    azithromycin (Z-NILDA) 250 MG tablet TAKE 2 TABLETS BY MOUTH FOR 1 DAY THEN TAKE 1 TABLET BY MOUTH DAILY FOR 4 DAYS      EScitalopram oxalate (LEXAPRO) 10 MG tablet Take 1 tablet by mouth once daily 90 tablet 3    fenofibrate 160 MG Tab Take 1 tablet by mouth once daily 90 tablet 3    gabapentin (NEURONTIN) 300 MG capsule Take 1 capsule by mouth once daily 90 capsule 3    glucosamine-chondroitin 500-400 mg tablet Take 1 tablet by mouth 3 (three) times daily.      ketoconazole (NIZORAL) 2 % shampoo Apply topically twice a week. 120 mL 2    meloxicam (MOBIC) 15 MG tablet TK 1 T PO QAM AFTER A MEAL FOR FOOT PAIN  3    metFORMIN (GLUCOPHAGE) 500 MG tablet Take 1 tablet by mouth once daily 90 tablet 3    pravastatin (PRAVACHOL) 40 MG tablet Take 1 tablet by mouth once daily 90 tablet 3    promethazine-dextromethorphan (PROMETHAZINE-DM) 6.25-15 mg/5 mL Syrp TAKE 10 ML BY MOUTH EVERY 8 HOURS FOR 8 DAYS AS NEEDED FOR CONGESTION OR COUGH      gabapentin (NEURONTIN) 100 MG capsule TAKE 1 CAPSULE BY MOUTH EVERY DAY AT BEDTIME. INCREASE TO 3 CAPSULES AS NEEDED (Patient not taking: Reported on 11/11/2022) 90 capsule 0     No current facility-administered medications on file prior to visit.       Allergies:  Thimerosal    Immunizations:  Immunization History   Administered Date(s) Administered    COVID-19 Vaccine  "09/28/2022    COVID-19, MRNA, LN-S, PF (Pfizer) (Gray Cap) 04/22/2022    COVID-19, MRNA, LN-S, PF (Pfizer) (Purple Cap) 02/25/2021, 03/18/2021, 10/06/2021    Influenza 01/01/2021    Influenza (FLUAD) - Quadrivalent - Adjuvanted - PF *Preferred* (65+) 10/07/2021    Influenza (FLUAD) - Trivalent - Adjuvanted - PF (65+) 10/25/2022    Influenza - Quadrivalent - PF *Preferred* (6 months and older) 11/06/2018, 12/18/2020    Pneumococcal Conjugate - 13 Valent 12/01/2020, 12/18/2020    Pneumococcal Polysaccharide - 23 Valent 10/07/2021    Tdap 08/05/2021    Zoster Recombinant 10/16/2021, 02/25/2022       Review of Systems:  Review of Systems    Objective:    Vitals:  Vitals:    11/11/22 1536   BP: 130/78   Pulse: (!) 58   SpO2: 97%   Weight: 115.5 kg (254 lb 10.1 oz)   Height: 6' 2" (1.88 m)   PainSc: 0-No pain       Physical Exam        Ke Power MD  Family Medicine        "

## 2023-01-10 ENCOUNTER — CLINICAL SUPPORT (OUTPATIENT)
Dept: CARDIOLOGY | Facility: HOSPITAL | Age: 68
End: 2023-01-10
Attending: INTERNAL MEDICINE
Payer: MEDICARE

## 2023-01-10 ENCOUNTER — LAB VISIT (OUTPATIENT)
Dept: LAB | Facility: HOSPITAL | Age: 68
End: 2023-01-10
Attending: INTERNAL MEDICINE
Payer: MEDICARE

## 2023-01-10 VITALS — BODY MASS INDEX: 32.6 KG/M2 | WEIGHT: 254 LBS | HEIGHT: 74 IN

## 2023-01-10 DIAGNOSIS — Z86.19 HISTORY OF HISTOPLASMOSIS: ICD-10-CM

## 2023-01-10 DIAGNOSIS — R94.31 ABNORMAL ECG: ICD-10-CM

## 2023-01-10 DIAGNOSIS — E78.5 DYSLIPIDEMIA: ICD-10-CM

## 2023-01-10 DIAGNOSIS — E11.9 TYPE 2 DIABETES MELLITUS WITHOUT COMPLICATION, WITHOUT LONG-TERM CURRENT USE OF INSULIN: ICD-10-CM

## 2023-01-10 DIAGNOSIS — I35.1 NONRHEUMATIC AORTIC VALVE INSUFFICIENCY: ICD-10-CM

## 2023-01-10 LAB
ALBUMIN SERPL BCP-MCNC: 3.6 G/DL (ref 3.5–5.2)
ALP SERPL-CCNC: 55 U/L (ref 55–135)
ALT SERPL W/O P-5'-P-CCNC: 24 U/L (ref 10–44)
ANION GAP SERPL CALC-SCNC: 8 MMOL/L (ref 8–16)
AST SERPL-CCNC: 21 U/L (ref 10–40)
BASOPHILS # BLD AUTO: 0.02 K/UL (ref 0–0.2)
BASOPHILS NFR BLD: 0.3 % (ref 0–1.9)
BILIRUB SERPL-MCNC: 0.5 MG/DL (ref 0.1–1)
BUN SERPL-MCNC: 16 MG/DL (ref 8–23)
CALCIUM SERPL-MCNC: 9.2 MG/DL (ref 8.7–10.5)
CHLORIDE SERPL-SCNC: 105 MMOL/L (ref 95–110)
CHOLEST SERPL-MCNC: 160 MG/DL (ref 120–199)
CHOLEST/HDLC SERPL: 4.1 {RATIO} (ref 2–5)
CK SERPL-CCNC: 101 U/L (ref 20–200)
CO2 SERPL-SCNC: 26 MMOL/L (ref 23–29)
CREAT SERPL-MCNC: 1.1 MG/DL (ref 0.5–1.4)
DIFFERENTIAL METHOD: ABNORMAL
EOSINOPHIL # BLD AUTO: 0.2 K/UL (ref 0–0.5)
EOSINOPHIL NFR BLD: 2.4 % (ref 0–8)
ERYTHROCYTE [DISTWIDTH] IN BLOOD BY AUTOMATED COUNT: 12.1 % (ref 11.5–14.5)
EST. GFR  (NO RACE VARIABLE): >60 ML/MIN/1.73 M^2
GLUCOSE SERPL-MCNC: 126 MG/DL (ref 70–110)
HCT VFR BLD AUTO: 45.8 % (ref 40–54)
HDLC SERPL-MCNC: 39 MG/DL (ref 40–75)
HDLC SERPL: 24.4 % (ref 20–50)
HGB BLD-MCNC: 15.3 G/DL (ref 14–18)
IMM GRANULOCYTES # BLD AUTO: 0.01 K/UL (ref 0–0.04)
IMM GRANULOCYTES NFR BLD AUTO: 0.2 % (ref 0–0.5)
LDLC SERPL CALC-MCNC: 88.4 MG/DL (ref 63–159)
LYMPHOCYTES # BLD AUTO: 1.9 K/UL (ref 1–4.8)
LYMPHOCYTES NFR BLD: 28.2 % (ref 18–48)
MCH RBC QN AUTO: 33 PG (ref 27–31)
MCHC RBC AUTO-ENTMCNC: 33.4 G/DL (ref 32–36)
MCV RBC AUTO: 99 FL (ref 82–98)
MONOCYTES # BLD AUTO: 0.7 K/UL (ref 0.3–1)
MONOCYTES NFR BLD: 10.7 % (ref 4–15)
NEUTROPHILS # BLD AUTO: 3.9 K/UL (ref 1.8–7.7)
NEUTROPHILS NFR BLD: 58.2 % (ref 38–73)
NONHDLC SERPL-MCNC: 121 MG/DL
NRBC BLD-RTO: 0 /100 WBC
PLATELET # BLD AUTO: 201 K/UL (ref 150–450)
PMV BLD AUTO: 11.3 FL (ref 9.2–12.9)
POTASSIUM SERPL-SCNC: 4.1 MMOL/L (ref 3.5–5.1)
PROT SERPL-MCNC: 7.3 G/DL (ref 6–8.4)
RBC # BLD AUTO: 4.63 M/UL (ref 4.6–6.2)
SODIUM SERPL-SCNC: 139 MMOL/L (ref 136–145)
TRIGL SERPL-MCNC: 163 MG/DL (ref 30–150)
WBC # BLD AUTO: 6.66 K/UL (ref 3.9–12.7)

## 2023-01-10 PROCEDURE — 82550 ASSAY OF CK (CPK): CPT | Performed by: INTERNAL MEDICINE

## 2023-01-10 PROCEDURE — 93306 TTE W/DOPPLER COMPLETE: CPT | Mod: 26,,, | Performed by: INTERNAL MEDICINE

## 2023-01-10 PROCEDURE — 93306 ECHO (CUPID ONLY): ICD-10-PCS | Mod: 26,,, | Performed by: INTERNAL MEDICINE

## 2023-01-10 PROCEDURE — 36415 COLL VENOUS BLD VENIPUNCTURE: CPT | Mod: PO | Performed by: INTERNAL MEDICINE

## 2023-01-10 PROCEDURE — 85025 COMPLETE CBC W/AUTO DIFF WBC: CPT | Performed by: INTERNAL MEDICINE

## 2023-01-10 PROCEDURE — 80061 LIPID PANEL: CPT | Performed by: INTERNAL MEDICINE

## 2023-01-10 PROCEDURE — 93306 TTE W/DOPPLER COMPLETE: CPT | Mod: PO

## 2023-01-10 PROCEDURE — 80053 COMPREHEN METABOLIC PANEL: CPT | Performed by: INTERNAL MEDICINE

## 2023-01-11 LAB
ASCENDING AORTA: 3.29 CM
AV INDEX (PROSTH): 0.77
AV MEAN GRADIENT: 6 MMHG
AV PEAK GRADIENT: 10 MMHG
AV REGURGITATION PRESSURE HALF TIME: 659.07 MS
AV VALVE AREA: 2.86 CM2
AV VELOCITY RATIO: 0.74
BSA FOR ECHO PROCEDURE: 2.45 M2
CV ECHO LV RWT: 0.43 CM
DOP CALC AO PEAK VEL: 1.6 M/S
DOP CALC AO VTI: 41.4 CM
DOP CALC LVOT AREA: 3.7 CM2
DOP CALC LVOT DIAMETER: 2.17 CM
DOP CALC LVOT PEAK VEL: 1.19 M/S
DOP CALC LVOT STROKE VOLUME: 118.29 CM3
DOP CALCLVOT PEAK VEL VTI: 32 CM
E WAVE DECELERATION TIME: 219.1 MSEC
E/A RATIO: 1.28
E/E' RATIO: 15.83 M/S
ECHO LV POSTERIOR WALL: 1.11 CM (ref 0.6–1.1)
EJECTION FRACTION: 70 %
FRACTIONAL SHORTENING: 29 % (ref 28–44)
INTERVENTRICULAR SEPTUM: 1.11 CM (ref 0.6–1.1)
IVRT: 117.98 MSEC
LA MAJOR: 6.14 CM
LA MINOR: 6.41 CM
LA WIDTH: 4.5 CM
LEFT ATRIUM SIZE: 3.95 CM
LEFT ATRIUM VOLUME INDEX: 39.3 ML/M2
LEFT ATRIUM VOLUME: 94.76 CM3
LEFT INTERNAL DIMENSION IN SYSTOLE: 3.67 CM (ref 2.1–4)
LEFT VENTRICLE DIASTOLIC VOLUME INDEX: 53.79 ML/M2
LEFT VENTRICLE DIASTOLIC VOLUME: 129.64 ML
LEFT VENTRICLE MASS INDEX: 93 G/M2
LEFT VENTRICLE SYSTOLIC VOLUME INDEX: 23.7 ML/M2
LEFT VENTRICLE SYSTOLIC VOLUME: 57.02 ML
LEFT VENTRICULAR INTERNAL DIMENSION IN DIASTOLE: 5.2 CM (ref 3.5–6)
LEFT VENTRICULAR MASS: 223.5 G
LV LATERAL E/E' RATIO: 15.83 M/S
LV SEPTAL E/E' RATIO: 15.83 M/S
LVOT MG: 3.23 MMHG
LVOT MV: 0.85 CM/S
MV PEAK A VEL: 0.74 M/S
MV PEAK E VEL: 0.95 M/S
MV STENOSIS PRESSURE HALF TIME: 63.54 MS
MV VALVE AREA P 1/2 METHOD: 3.46 CM2
PISA AR MAX VEL: 3.63 M/S
PISA TR MAX VEL: 2.48 M/S
PULM VEIN S/D RATIO: 1.57
PV PEAK D VEL: 0.28 M/S
PV PEAK S VEL: 0.44 M/S
RA MAJOR: 5.33 CM
RA WIDTH: 3.66 CM
RIGHT VENTRICULAR END-DIASTOLIC DIMENSION: 3.98 CM
RIGHT VENTRICULAR LENGTH IN DIASTOLE (APICAL 4-CHAMBER VIEW): 6.74 CM
RV MID DIAMA: 2.88 CM
RV TISSUE DOPPLER FREE WALL SYSTOLIC VELOCITY 1 (APICAL 4 CHAMBER VIEW): 0.01 CM/S
SINUS: 3.42 CM
STJ: 3.14 CM
TDI LATERAL: 0.06 M/S
TDI SEPTAL: 0.06 M/S
TDI: 0.06 M/S
TR MAX PG: 25 MMHG
TRICUSPID ANNULAR PLANE SYSTOLIC EXCURSION: 2.09 CM

## 2023-01-18 ENCOUNTER — OFFICE VISIT (OUTPATIENT)
Dept: CARDIOLOGY | Facility: CLINIC | Age: 68
End: 2023-01-18
Payer: MEDICARE

## 2023-01-18 VITALS
DIASTOLIC BLOOD PRESSURE: 87 MMHG | HEIGHT: 74 IN | BODY MASS INDEX: 33.13 KG/M2 | WEIGHT: 258.19 LBS | HEART RATE: 55 BPM | SYSTOLIC BLOOD PRESSURE: 161 MMHG

## 2023-01-18 DIAGNOSIS — I10 PRIMARY HYPERTENSION: ICD-10-CM

## 2023-01-18 DIAGNOSIS — I35.1 NONRHEUMATIC AORTIC (VALVE) INSUFFICIENCY: Primary | ICD-10-CM

## 2023-01-18 DIAGNOSIS — Z13.6 ENCOUNTER FOR ABDOMINAL AORTIC ANEURYSM (AAA) SCREENING: ICD-10-CM

## 2023-01-18 PROCEDURE — 99213 PR OFFICE/OUTPT VISIT, EST, LEVL III, 20-29 MIN: ICD-10-PCS | Mod: S$PBB,,, | Performed by: INTERNAL MEDICINE

## 2023-01-18 PROCEDURE — 99999 PR PBB SHADOW E&M-EST. PATIENT-LVL III: CPT | Mod: PBBFAC,,, | Performed by: INTERNAL MEDICINE

## 2023-01-18 PROCEDURE — 99213 OFFICE O/P EST LOW 20 MIN: CPT | Mod: PBBFAC,PO | Performed by: INTERNAL MEDICINE

## 2023-01-18 PROCEDURE — 99213 OFFICE O/P EST LOW 20 MIN: CPT | Mod: S$PBB,,, | Performed by: INTERNAL MEDICINE

## 2023-01-18 PROCEDURE — 99999 PR PBB SHADOW E&M-EST. PATIENT-LVL III: ICD-10-PCS | Mod: PBBFAC,,, | Performed by: INTERNAL MEDICINE

## 2023-01-18 NOTE — PATIENT INSTRUCTIONS
Check your blood pressure at home at least three times a week. Goal <140/90  US for abdominal aneurysm screening  Return in 12 months

## 2023-01-18 NOTE — PROGRESS NOTES
Cardiology Clinic Note      Patient: Hank Montoya, 1955, [id number]  Primary Care Provider: Ke Power MD     Chief Complaint/Reason for Referral:  Follow-up     Subjective:       Hank Montoya is a 67 y.o. male who presents for follow-up.  He is unaccompanied    Upper respiratory symptoms. Taking decongestants. No syncope. No SOBOE. No CP. No edema. No orthopnea/Pnd. No palps. Does yard work.       Focused Past History includes:  TTE 1/2023: normal biventricualr size and systolic function. LVEF 70%. Mild MR. Mild AR (could not identify individual leaflets). RAMSES without LVOTO. Aortic root is normal size  Coronary calcium score is 0 December 2019  Prediabetes on metformin  No family history of valve disease, ASCVD, aneurysm or SCD.   Never smoker, rare EtOH, no recreational drugs      Review of Systems  Constitutional: negative for fevers, night sweats, and weight loss  Eyes: negative for visual disturbance, diplopia  Respiratory: negative for cough, hemoptysis, sputum, and wheezing  Cardiovascular: see HPI  Gastrointestinal: negative for abdominal pain, bright red blood per rectum, change in bowel habits, dysphagia, melena, and reflux symptoms  Genitourinary:negative for dysuria, frequency, and hematuria  Hematologic/lymphatic: negative for bleeding, easy bruising, and lymphadenopathy  Musculoskeletal:negative for arthralgias, back pain, and myalgias  Neurological: negative for gait problems, paresthesia, speech problems, vertigo, and weakness  Behavioral/Psych: negative for excessive alcohol consumption, illegal drug usage, and sleep disturbance    ----------------------------  Depression  Diabetes mellitus, type 2  Hyperlipidemia  ----------------------------  Appendectomy  Hernia repair     Family History   Problem Relation Age of Onset    Alcohol abuse Mother     Depression Mother     Early death Mother     Diabetes Paternal Uncle     Diabetes Father     Diabetes Paternal Uncle      Social History  "    Tobacco Use    Smoking status: Never    Smokeless tobacco: Never   Substance Use Topics    Alcohol use: Not Currently     Alcohol/week: 1.0 standard drink     Types: 1 Cans of beer per week    Drug use: Never       Current Outpatient Medications   Medication Sig Dispense Refill    clobetasoL (TEMOVATE) 0.05 % external solution Apply topically 2 (two) times daily. 50 mL 2    EScitalopram oxalate (LEXAPRO) 10 MG tablet Take 1 tablet by mouth once daily 90 tablet 3    fenofibrate 160 MG Tab Take 1 tablet by mouth once daily 90 tablet 3    gabapentin (NEURONTIN) 300 MG capsule Take 1 capsule by mouth once daily 90 capsule 3    glucosamine-chondroitin 500-400 mg tablet Take 1 tablet by mouth 3 (three) times daily.      ketoconazole (NIZORAL) 2 % shampoo Apply topically twice a week. 120 mL 2    meloxicam (MOBIC) 15 MG tablet TK 1 T PO QAM AFTER A MEAL FOR FOOT PAIN  3    metFORMIN (GLUCOPHAGE) 500 MG tablet Take 1 tablet by mouth once daily 90 tablet 3    pravastatin (PRAVACHOL) 40 MG tablet Take 1 tablet by mouth once daily 90 tablet 3    azithromycin (Z-NILDA) 250 MG tablet TAKE 2 TABLETS BY MOUTH FOR 1 DAY THEN TAKE 1 TABLET BY MOUTH DAILY FOR 4 DAYS      promethazine-dextromethorphan (PROMETHAZINE-DM) 6.25-15 mg/5 mL Syrp TAKE 10 ML BY MOUTH EVERY 8 HOURS FOR 8 DAYS AS NEEDED FOR CONGESTION OR COUGH       No current facility-administered medications for this visit.        Objective:      Physical Exam  BP (!) 161/87 (BP Location: Right arm, Patient Position: Sitting, BP Method: Medium (Automatic))   Pulse (!) 55   Ht 6' 2" (1.88 m)   Wt 117.1 kg (258 lb 2.5 oz)   BMI 33.15 kg/m²   Body surface area is 2.47 meters squared.  Body mass index is 33.15 kg/m².    General appearance: alert, appears stated age, cooperative, and no distress  Head: Normocephalic, without obvious abnormality, atraumatic  Neck: no carotid bruit, no JVD, and supple, symmetrical, trachea midline  Lungs: clear to auscultation " bilaterally  Heart: regular rate and rhythm; S1, S2 normal, no murmur, click, rub or gallop  Abdomen: soft, non-tender, no distended  Extremities: extremities atraumatic, no pitting edema  Skin: warm, no cyanosis, no pathologic ecchymosis in exposed portions  Neurologic: Grossly normal. A&O x3      Lab Review   Lab Results   Component Value Date    WBC 6.66 01/10/2023    HGB 15.3 01/10/2023    HCT 45.8 01/10/2023    MCV 99 (H) 01/10/2023     01/10/2023         BMP  Lab Results   Component Value Date     01/10/2023    K 4.1 01/10/2023     01/10/2023    CO2 26 01/10/2023    BUN 16 01/10/2023    CREATININE 1.1 01/10/2023    CALCIUM 9.2 01/10/2023    ANIONGAP 8 01/10/2023    ESTGFRAFRICA >60.0 06/02/2022    EGFRNONAA >60.0 06/02/2022       Lab Results   Component Value Date    LABPROT 35 04/08/2004    ALBUMIN 3.6 01/10/2023       Lab Results   Component Value Date    ALT 24 01/10/2023    AST 21 01/10/2023    ALKPHOS 55 01/10/2023    BILITOT 0.5 01/10/2023       Lab Results   Component Value Date    TSH 2.386 06/02/2022       Lab Results   Component Value Date    CHOL 160 01/10/2023    CHOL 163 06/02/2022    CHOL 155 07/16/2021     Lab Results   Component Value Date    HDL 39 (L) 01/10/2023    HDL 37 (L) 06/02/2022    HDL 41 07/16/2021     Lab Results   Component Value Date    LDLCALC 88.4 01/10/2023    LDLCALC 85.0 06/02/2022    LDLCALC 80.2 07/16/2021     Lab Results   Component Value Date    TRIG 163 (H) 01/10/2023    TRIG 205 (H) 06/02/2022    TRIG 169 (H) 07/16/2021     Lab Results   Component Value Date    CHOLHDL 24.4 01/10/2023    CHOLHDL 22.7 06/02/2022    CHOLHDL 26.5 07/16/2021        Assessment & Plan:      This is a 67 y.o. pleasant  male.  He has no cardiac symptoms of concern with good functional capacity.  We talked about his mild valvular disease.  We will monitor his blood pressure at home and start an antihypertensive if greater than 140/90     1. Encounter for abdominal  aortic aneurysm (AAA) screening  CV AAA Screening      2. Primary hypertension        3. Nonrheumatic aortic (valve) insufficiency             Home blood pressure monitoring.  His blood pressure should be maintained less than 140/90 with preference for a thiazide or ACE inhibitor as an initial antihypertensive  AAA screening with ultrasound  TTE in 2 years      I appreciate the opportunity to participate in Hank Montoya 's care today.  Please follow up with me in 12 months.      Maurizio Wright MD, FACC  Interventional Cardiology/Structural Heart Disease  [facility]  Office: [phone]     Parts of this note were completed using voice recognition software. Please excuse any misspellings or syntax errors and reach out to me with questions.

## 2023-01-19 DIAGNOSIS — R94.31 ABNORMAL ECG: Primary | ICD-10-CM

## 2023-01-19 PROBLEM — I10 PRIMARY HYPERTENSION: Status: ACTIVE | Noted: 2023-01-19

## 2023-01-19 PROBLEM — Z13.6 ENCOUNTER FOR ABDOMINAL AORTIC ANEURYSM (AAA) SCREENING: Status: ACTIVE | Noted: 2023-01-19

## 2023-01-23 ENCOUNTER — CLINICAL SUPPORT (OUTPATIENT)
Dept: CARDIOLOGY | Facility: HOSPITAL | Age: 68
End: 2023-01-23
Attending: INTERNAL MEDICINE
Payer: MEDICARE

## 2023-01-23 DIAGNOSIS — Z13.6 ENCOUNTER FOR ABDOMINAL AORTIC ANEURYSM (AAA) SCREENING: ICD-10-CM

## 2023-01-23 DIAGNOSIS — R94.31 ABNORMAL ECG: ICD-10-CM

## 2023-01-23 PROCEDURE — 93978 VASCULAR STUDY: CPT | Mod: PO

## 2023-01-23 PROCEDURE — 93005 ELECTROCARDIOGRAM TRACING: CPT | Mod: PO

## 2023-01-23 PROCEDURE — 93978 CV US ABDOMINAL AORTA EVALUATION (CUPID ONLY): ICD-10-PCS | Mod: 26,,, | Performed by: INTERNAL MEDICINE

## 2023-01-23 PROCEDURE — 93010 ELECTROCARDIOGRAM REPORT: CPT | Mod: ,,, | Performed by: INTERNAL MEDICINE

## 2023-01-23 PROCEDURE — 93978 VASCULAR STUDY: CPT | Mod: 26,,, | Performed by: INTERNAL MEDICINE

## 2023-01-23 PROCEDURE — 93010 EKG 12-LEAD: ICD-10-PCS | Mod: ,,, | Performed by: INTERNAL MEDICINE

## 2023-01-24 LAB
ABDOMINAL IMA AP: 1.7 CM
ABDOMINAL IMA PS VEL: 121 CM/S
ABDOMINAL IMA TRANS: 1.7 CM
ABDOMINAL INFRARENAL AORTA AP: 1.9 CM
ABDOMINAL INFRARENAL AORTA PS VEL: 95 CM/S
ABDOMINAL INFRARENAL AORTA TRANS: 1.9 CM
ABDOMINAL JUXTARENAL AORTA AP: 2 CM
ABDOMINAL JUXTARENAL AORTA ED VEL: 24 CM/S
ABDOMINAL JUXTARENAL AORTA PS VEL: 81 CM/S
ABDOMINAL JUXTARENAL AORTA TRANS: 2 CM
ABDOMINAL LT COM ILIAC AP: 1.3 CM
ABDOMINAL LT COM ILIAC TRANS: 1.3 CM
ABDOMINAL LT COM ILIAC VEL: 170 CM/S
ABDOMINAL RT COM ILIAC AP: 1.3 CM
ABDOMINAL RT COM ILIAC TRANS: 1.3 CM
ABDOMINAL RT COM ILIAC VEL: 203 CM/S
ABDOMINAL SUPRARENAL AORTA AP: 2 CM
ABDOMINAL SUPRARENAL AORTA ED VEL: 21 CM/S
ABDOMINAL SUPRARENAL AORTA PS VEL: 119 CM/S
ABDOMINAL SUPRARENAL AORTA TRANS: 2 CM

## 2023-03-08 ENCOUNTER — OFFICE VISIT (OUTPATIENT)
Dept: FAMILY MEDICINE | Facility: CLINIC | Age: 68
End: 2023-03-08
Payer: MEDICARE

## 2023-03-08 VITALS
SYSTOLIC BLOOD PRESSURE: 138 MMHG | OXYGEN SATURATION: 95 % | BODY MASS INDEX: 32.82 KG/M2 | HEIGHT: 74 IN | WEIGHT: 255.75 LBS | HEART RATE: 55 BPM | DIASTOLIC BLOOD PRESSURE: 70 MMHG

## 2023-03-08 DIAGNOSIS — E11.9 ENCOUNTER FOR DIABETIC FOOT EXAM: ICD-10-CM

## 2023-03-08 DIAGNOSIS — M62.08 DIASTASIS RECTI: Primary | ICD-10-CM

## 2023-03-08 PROCEDURE — 99213 OFFICE O/P EST LOW 20 MIN: CPT | Mod: PBBFAC,PN

## 2023-03-08 PROCEDURE — 99999 PR PBB SHADOW E&M-EST. PATIENT-LVL III: ICD-10-PCS | Mod: PBBFAC,,,

## 2023-03-08 PROCEDURE — 99999 PR PBB SHADOW E&M-EST. PATIENT-LVL III: CPT | Mod: PBBFAC,,,

## 2023-03-08 PROCEDURE — 99213 OFFICE O/P EST LOW 20 MIN: CPT | Mod: S$PBB,,,

## 2023-03-08 PROCEDURE — 99213 PR OFFICE/OUTPT VISIT, EST, LEVL III, 20-29 MIN: ICD-10-PCS | Mod: S$PBB,,,

## 2023-03-08 NOTE — PROGRESS NOTES
"  [facility]  [address]  [place] [address]    Subjective    Chief Complaint:   Chief Complaint   Patient presents with    Annual Exam     Pt was doing sit ups last night and now ha a bulge right under his sternum        History of Present Illness:     Hank Montoya is a(n) 67 y.o. male with past medical history as noted below who presents to the clinic today for diabetic foot exam and abdominal bulge.    Abdominal bulge:  Was doing sit ups last night when he noticed a bulge in the middle is his stomach, from sternum to belly button, when doing sit ups. He denies pain in this area, no fevers, no nausea/vomiting, no constipation. Bulge is not "stuck" and returns to normal when he is not in a "sit-up" position. Has not noticed this bulge before.     Had an AAA screening ultrasound done January 2023, is worried the pressure applied from this screening may have caused the abdominal bulge.     Diabetic foot exam:  Has gotten alerts for being overdue, would like this done today. He clips his own toenails.       Chief Complaint   Patient presents with    Annual Exam     Pt was doing sit ups last night and now ha a bulge right under his sternum        Dyslipidemia  Rx-pravastatin 10 mg, fenofibrate 160 mg  Cardiology - Dr. Tom   Denies exertional chest symptoms, fairly active with yard work       Type 2 diabetes mellitus  Rx-Glucophage 500 mg  Previous A1c 5.9%  Currently on statin drug     Depression  Rx-Lexapro 10 mg  Participated in counseling  Medication working well      Bilateral Femoral Lateral Cutaneous Neuropathy  Gabapentin 300 + 100 mg daily, controls symptoms well      ? History of Histoplasmosis Infection   Lung / Eye   Dx : about 20 - 25 years ago   Initially : eye started bleeding   Reports recent abnormal PFT; possibly related to scarring from infection   Denies any SOB / Wheeze     Problem List:   Patient Active Problem List   Diagnosis    " Dyslipidemia    Abnormal ECG    Type 2 diabetes mellitus with diabetic polyneuropathy, without long-term current use of insulin    Depression    History of histoplasmosis    Nonrheumatic aortic (valve) insufficiency    Primary hypertension    Encounter for abdominal aortic aneurysm (AAA) screening       Current Outpatient Medications:   Current Outpatient Medications   Medication Instructions    azithromycin (Z-NILDA) 250 MG tablet TAKE 2 TABLETS BY MOUTH FOR 1 DAY THEN TAKE 1 TABLET BY MOUTH DAILY FOR 4 DAYS    clobetasoL (TEMOVATE) 0.05 % external solution Topical (Top), 2 times daily    EScitalopram oxalate (LEXAPRO) 10 MG tablet Take 1 tablet by mouth once daily    fenofibrate 160 MG Tab Take 1 tablet by mouth once daily    gabapentin (NEURONTIN) 300 MG capsule Take 1 capsule by mouth once daily    glucosamine-chondroitin 500-400 mg tablet 1 tablet, Oral, 3 times daily    ketoconazole (NIZORAL) 2 % shampoo Topical (Top), Twice weekly    meloxicam (MOBIC) 15 MG tablet TK 1 T PO QAM AFTER A MEAL FOR FOOT PAIN    metFORMIN (GLUCOPHAGE) 500 MG tablet Take 1 tablet by mouth once daily    pravastatin (PRAVACHOL) 40 MG tablet Take 1 tablet by mouth once daily    promethazine-dextromethorphan (PROMETHAZINE-DM) 6.25-15 mg/5 mL Syrp TAKE 10 ML BY MOUTH EVERY 8 HOURS FOR 8 DAYS AS NEEDED FOR CONGESTION OR COUGH       Surgical History:   Past Surgical History:   Procedure Laterality Date    APPENDECTOMY  1965    HERNIA REPAIR  2000       Family History:   Family History   Problem Relation Age of Onset    Alcohol abuse Mother     Depression Mother     Early death Mother     Diabetes Paternal Uncle     Diabetes Father     Diabetes Paternal Uncle        Allergies:   Review of patient's allergies indicates:   Allergen Reactions    Thimerosal        Tobacco Status:   Tobacco Use: Low Risk     Smoking Tobacco Use: Never    Smokeless Tobacco Use: Never    Passive Exposure: Not on file       Sexual Activity:   Social History  "    Substance and Sexual Activity   Sexual Activity Not Currently    Partners: Female    Birth control/protection: Abstinence    Comment: Not sexually active       Alcohol Use:   Social History     Substance and Sexual Activity   Alcohol Use Not Currently    Alcohol/week: 1.0 standard drink    Types: 1 Cans of beer per week         Objective       Vitals:    03/08/23 1539   BP: 138/70   Pulse: (!) 55   SpO2: 95%   Weight: 116 kg (255 lb 11.7 oz)   Height: 6' 2" (1.88 m)       Review of Systems   HENT:  Negative for hearing loss.    Eyes:  Negative for discharge.   Respiratory:  Negative for wheezing.    Cardiovascular:  Negative for chest pain and palpitations.   Gastrointestinal:  Negative for abdominal pain, blood in stool, constipation, diarrhea, nausea and vomiting.   Genitourinary:  Negative for hematuria and urgency.   Musculoskeletal:  Negative for neck pain.   Neurological:  Negative for weakness and headaches.   Endo/Heme/Allergies:  Negative for polydipsia.     Physical Exam  Constitutional:       General: He is not in acute distress.     Appearance: Normal appearance. He is not ill-appearing.   HENT:      Head: Normocephalic and atraumatic.   Eyes:      Conjunctiva/sclera: Conjunctivae normal.   Cardiovascular:      Rate and Rhythm: Normal rate and regular rhythm.      Pulses: Normal pulses.           Dorsalis pedis pulses are 2+ on the right side and 2+ on the left side.        Posterior tibial pulses are 2+ on the right side and 2+ on the left side.      Heart sounds: Normal heart sounds. No murmur heard.  Pulmonary:      Effort: Pulmonary effort is normal. No respiratory distress.      Breath sounds: Normal breath sounds. No wheezing or rales.   Abdominal:      Palpations: Abdomen is soft.      Tenderness: There is no abdominal tenderness.      Comments: +protrusion in midline of abdomen, from sternum to belly button, when patient flexes abdominal muscles in the "crunch" position. No TTP, no overlying " redness, no warmth   Musculoskeletal:      Cervical back: Normal range of motion.      Right foot: Normal range of motion. No deformity, Charcot foot or prominent metatarsal heads.      Left foot: Normal range of motion. No deformity, Charcot foot or prominent metatarsal heads.   Feet:      Right foot:      Protective Sensation: 10 sites tested.  10 sites sensed.      Skin integrity: Dry skin present. No ulcer, blister, skin breakdown, warmth, callus or fissure.      Toenail Condition: Right toenails are normal.      Left foot:      Protective Sensation: 10 sites tested.  9 sites sensed.      Skin integrity: Dry skin present. No ulcer, blister, skin breakdown, erythema, warmth, callus or fissure.      Toenail Condition: Left toenails are normal.      Comments: Footwear: appropriate  Skin:     General: Skin is warm.   Neurological:      Mental Status: He is alert and oriented to person, place, and time.   Psychiatric:         Behavior: Behavior normal.       Reason for Exam  Priority: Routine  AAA screening   Dx: Encounter for abdominal aortic aneurysm (AAA) screening [Z13.6 (ICD-10-CM)]     Conclusion    No evidence of stenosis or aneurysm in the abdominal aorta or bilateral common iliac arteries.  Normal Doppler waveformes in the bilateral CIAs.  Assessment and Plan:    1. Diastasis recti    2. Encounter for diabetic foot exam        Visit summary:    Hank Montoya presented today for abdominal protrusion consistent with diastasis recti and diabetic foot exam.      Discussed that based on my exam, this is likely diastasis recti. Presentation not consistent with strangulated or incarcerated hernia. AAA screen negative    Discussed ultrasound could be a valuable tool to further assess and rule out other types of hernias. Also mentioned general surgery referral is an option to assess need for surgical intervention. Patient declined and said he will contact our office if he would like further assessment via  ultrasound or general surgery.     Patient was instructed to report to ER if symptoms become severe, including but not limited to abdominal pain, hernia that will not reduce, redness/pain/warmth in the area, nausea/vomiting/obstipation.    Patient concerned AAA screening ultrasound done in January 2023 caused his symptoms today. I informed him that I feel this is unlikely, though I can not say for sure because I was not present during this procedure. He requested a contact for assistance with investigating the matter further, I gave him the number for patient advocacy after consulting nursing staff.     Diabetic foot exam done today, 9/10 protective sensation sites felt on his right foot. Instructed to monitor for numbness or tingling in his feet. Advised to be careful with toenail trimmings and monitor feet for disruptions in skin integrity.    Follow up: No follow-ups on file.      Christiane King PA-C

## 2023-04-24 PROBLEM — Z13.6 ENCOUNTER FOR ABDOMINAL AORTIC ANEURYSM (AAA) SCREENING: Status: RESOLVED | Noted: 2023-01-19 | Resolved: 2023-04-24

## 2023-05-08 NOTE — TELEPHONE ENCOUNTER
Care Due:                  Date            Visit Type   Department     Provider  --------------------------------------------------------------------------------                                EP -                              PRIMARY      [facility]  Last Visit: 11-      CARE ([facility])   MEDICINE       Ke Power  Next Visit: None Scheduled  None         None Found                                                            Last  Test          Frequency    Reason                     Performed    Due Date  --------------------------------------------------------------------------------    HBA1C.......  6 months...  metFORMIN................  11-   05-    [facility] Embedded Care Due Messages. Reference number: [id number].   5/08/2023 11:01:46 AM CDT

## 2023-05-09 RX ORDER — PRAVASTATIN SODIUM 40 MG/1
TABLET ORAL
Qty: 90 TABLET | Refills: 1 | Status: SHIPPED | OUTPATIENT
Start: 2023-05-09 | End: 2023-12-17

## 2023-05-09 RX ORDER — METFORMIN HYDROCHLORIDE 500 MG/1
TABLET ORAL
Qty: 90 TABLET | Refills: 0 | Status: SHIPPED | OUTPATIENT
Start: 2023-05-09 | End: 2023-08-15

## 2023-05-09 RX ORDER — GABAPENTIN 300 MG/1
CAPSULE ORAL
Qty: 90 CAPSULE | Refills: 0 | Status: SHIPPED | OUTPATIENT
Start: 2023-05-09 | End: 2023-07-31

## 2023-05-09 NOTE — TELEPHONE ENCOUNTER
Refill Routing Note   Medication(s) are not appropriate for processing by [facility] for the following reason(s):      Medication outside of protocol  Required labs outdated    [facility] action(s):  Defer  Route  Approve Care Due:  Labs due          Appointments  past 12m or future 3m with PCP    Date Provider   Last Visit   11/11/2022 Ke Power MD   Next Visit   Visit date not found Ke Power MD   ED visits in past 90 days: 0        Note composed:3:58 AM 05/09/2023

## 2023-05-24 DIAGNOSIS — E11.9 TYPE 2 DIABETES MELLITUS WITHOUT COMPLICATION: ICD-10-CM

## 2023-05-29 ENCOUNTER — PATIENT MESSAGE (OUTPATIENT)
Dept: ADMINISTRATIVE | Facility: HOSPITAL | Age: 68
End: 2023-05-29
Payer: MEDICARE

## 2023-07-29 NOTE — TELEPHONE ENCOUNTER
Care Due:                  Date            Visit Type   Department     Provider  --------------------------------------------------------------------------------                                EP -                              PRIMARY      [facility]  Last Visit: 11-      CARE ([facility])   MEDICINE       Ke Power  Next Visit: None Scheduled  None         None Found                                                            Last  Test          Frequency    Reason                     Performed    Due Date  --------------------------------------------------------------------------------    HBA1C.......  6 months...  metFORMIN................  11-   05-    [facility] Embedded Care Due Messages. Reference number: [id number].   7/29/2023 9:09:20 AM CDT

## 2023-07-31 RX ORDER — GABAPENTIN 300 MG/1
CAPSULE ORAL
Qty: 90 CAPSULE | Refills: 0 | Status: SHIPPED | OUTPATIENT
Start: 2023-07-31 | End: 2023-11-21

## 2023-08-15 DIAGNOSIS — E11.42 TYPE 2 DIABETES MELLITUS WITH DIABETIC POLYNEUROPATHY, WITHOUT LONG-TERM CURRENT USE OF INSULIN: Primary | ICD-10-CM

## 2023-08-15 RX ORDER — METFORMIN HYDROCHLORIDE 500 MG/1
TABLET ORAL
Qty: 90 TABLET | Refills: 3 | Status: SHIPPED | OUTPATIENT
Start: 2023-08-15

## 2023-08-15 NOTE — TELEPHONE ENCOUNTER
Care Due:                  Date            Visit Type   Department     Provider  --------------------------------------------------------------------------------                                EP -                              PRIMARY      [facility]  Last Visit: 11-      CARE ([facility])   MEDICINE       Ke Power  Next Visit: None Scheduled  None         None Found                                                            Last  Test          Frequency    Reason                     Performed    Due Date  --------------------------------------------------------------------------------    Office Visit  12 months..  EScitalopram,              11- 11-                             fenofibrate, metFORMIN,                             pravastatin..............    [employer] Embedded Care Due Messages. Reference number: [id number].   8/15/2023 1:02:31 PM CDT

## 2023-08-15 NOTE — TELEPHONE ENCOUNTER
Refill Routing Note   Medication(s) are not appropriate for processing by [facility] for the following reason(s):      Required labs outdated    [facility] action(s):  Defer Care Due:  Appointment due            Appointments  past 12m or future 3m with PCP    Date Provider   Last Visit   11/11/2022 Ke Power MD   Next Visit   Visit date not found Ke Power MD   ED visits in past 90 days: 0        Note composed:1:25 PM 08/15/2023

## 2023-08-17 DIAGNOSIS — E11.9 TYPE 2 DIABETES MELLITUS WITHOUT COMPLICATION: ICD-10-CM

## 2023-09-05 ENCOUNTER — LAB VISIT (OUTPATIENT)
Dept: LAB | Facility: HOSPITAL | Age: 68
End: 2023-09-05
Attending: FAMILY MEDICINE
Payer: MEDICARE

## 2023-09-05 ENCOUNTER — OFFICE VISIT (OUTPATIENT)
Dept: FAMILY MEDICINE | Facility: CLINIC | Age: 68
End: 2023-09-05
Payer: MEDICARE

## 2023-09-05 VITALS
WEIGHT: 255.31 LBS | HEIGHT: 74 IN | OXYGEN SATURATION: 99 % | DIASTOLIC BLOOD PRESSURE: 72 MMHG | BODY MASS INDEX: 32.77 KG/M2 | SYSTOLIC BLOOD PRESSURE: 134 MMHG | HEART RATE: 57 BPM

## 2023-09-05 DIAGNOSIS — Z12.5 PROSTATE CANCER SCREENING: ICD-10-CM

## 2023-09-05 DIAGNOSIS — E11.59 HYPERTENSION ASSOCIATED WITH DIABETES: ICD-10-CM

## 2023-09-05 DIAGNOSIS — I15.2 HYPERTENSION ASSOCIATED WITH DIABETES: ICD-10-CM

## 2023-09-05 DIAGNOSIS — N39.41 URGE INCONTINENCE: ICD-10-CM

## 2023-09-05 DIAGNOSIS — E11.9 TYPE 2 DIABETES MELLITUS WITHOUT COMPLICATION: ICD-10-CM

## 2023-09-05 DIAGNOSIS — R53.83 FATIGUE, UNSPECIFIED TYPE: ICD-10-CM

## 2023-09-05 DIAGNOSIS — L21.9 SEBORRHEIC DERMATITIS: ICD-10-CM

## 2023-09-05 DIAGNOSIS — Z23 IMMUNIZATION DUE: Primary | ICD-10-CM

## 2023-09-05 DIAGNOSIS — Z79.899 DRUG THERAPY: ICD-10-CM

## 2023-09-05 LAB
ALBUMIN/CREAT UR: 17.1 UG/MG (ref 0–30)
CREAT UR-MCNC: 164 MG/DL (ref 23–375)
ESTIMATED AVG GLUCOSE: 128 MG/DL (ref 68–131)
HBA1C MFR BLD: 6.1 % (ref 4–5.6)
MICROALBUMIN UR DL<=1MG/L-MCNC: 28 UG/ML

## 2023-09-05 PROCEDURE — 99214 OFFICE O/P EST MOD 30 MIN: CPT | Mod: S$PBB,,, | Performed by: FAMILY MEDICINE

## 2023-09-05 PROCEDURE — 83036 HEMOGLOBIN GLYCOSYLATED A1C: CPT | Performed by: FAMILY MEDICINE

## 2023-09-05 PROCEDURE — 99999 PR PBB SHADOW E&M-EST. PATIENT-LVL III: ICD-10-PCS | Mod: PBBFAC,,, | Performed by: FAMILY MEDICINE

## 2023-09-05 PROCEDURE — 99213 OFFICE O/P EST LOW 20 MIN: CPT | Mod: PBBFAC,PN | Performed by: FAMILY MEDICINE

## 2023-09-05 PROCEDURE — 99214 PR OFFICE/OUTPT VISIT, EST, LEVL IV, 30-39 MIN: ICD-10-PCS | Mod: S$PBB,,, | Performed by: FAMILY MEDICINE

## 2023-09-05 PROCEDURE — 82043 UR ALBUMIN QUANTITATIVE: CPT | Performed by: FAMILY MEDICINE

## 2023-09-05 PROCEDURE — 99999 PR PBB SHADOW E&M-EST. PATIENT-LVL III: CPT | Mod: PBBFAC,,, | Performed by: FAMILY MEDICINE

## 2023-09-05 PROCEDURE — 36415 COLL VENOUS BLD VENIPUNCTURE: CPT | Mod: PN | Performed by: FAMILY MEDICINE

## 2023-09-05 RX ORDER — KETOCONAZOLE 20 MG/ML
SHAMPOO, SUSPENSION TOPICAL
Qty: 120 ML | Refills: 2 | Status: SHIPPED | OUTPATIENT
Start: 2023-09-07

## 2023-09-05 RX ORDER — TAMSULOSIN HYDROCHLORIDE 0.4 MG/1
0.4 CAPSULE ORAL DAILY
Qty: 30 CAPSULE | Refills: 11 | Status: SHIPPED | OUTPATIENT
Start: 2023-09-05 | End: 2024-01-23

## 2023-09-05 NOTE — PROGRESS NOTES
THIS DOCUMENT WAS MADE IN PART WITH VOICE RECOGNITION SOFTWARE.  OCCASIONALLY THIS SOFTWARE WILL MISINTERPRET WORDS OR PHRASES.    Assessment and Plan:    1. Immunization due        2. Hypertension associated with diabetes        3. Drug therapy  CBC Auto Differential    Comprehensive Metabolic Panel    Lipid Panel    Hemoglobin A1C    TSH    Urinalysis, Reflex to Urine Culture Urine, Clean Catch    Urinalysis Microscopic      4. Prostate cancer screening  PSA, Screening      5. Fatigue, unspecified type  Vitamin B12    Methylmalonic Acid, Serum    Sedimentation rate    C-Reactive Protein    Testosterone    Iron and TIBC    GLENIS Screen w/Reflex      6. Urge incontinence  tamsulosin (FLOMAX) 0.4 mg Cap          Wellness labs, fatigue labs prior to next visit   Consider sleep apnea testing for fatigue complaint    Urge incontinence, urinary hesitancy-possibly secondary to enlarged prostate noted on exam   New medicine Flomax, Kegel exercises recommended, information given   Consider medicine for overactive bladder if no improvement    Other chronic conditions appear to be well controlled     Counseled on COVID vaccine    ______________________________________________________________________  Subjective:    Chief Complaint:  Chief Complaint   Patient presents with    Annual Exam     Check up, saw Yen in March        HPI:  Hank is a 68 y.o. year old     Recurrence of Seb Derm   Requesting refill of shampoo     Urinary urgency/incontinence   Has trouble making it to the bathroom in time sometimes   Reports associated hesitancy   Noted to have large prostate on exam today          Dyslipidemia  Rx-pravastatin 10 mg, fenofibrate 160 mg  Cardiology - Dr. Tom   Denies exertional chest symptoms, fairly active with yard work       Type 2 diabetes mellitus  Rx-Glucophage 500 mg  Previous A1c 5.9%  Currently on statin drug     Depression  Rx-Lexapro 10 mg  Participated in counseling  Medication working well      Bilateral  Femoral Lateral Cutaneous Neuropathy  Gabapentin 300 + 100 mg daily, controls symptoms well      ? History of Histoplasmosis Infection   Lung / Eye   Dx : about 20 - 25 years ago   Initially : eye started bleeding   Reports recent abnormal PFT; possibly related to scarring from infection   Denies any SOB / Wheeze           Past Medical History:  Past Medical History:   Diagnosis Date    Depression     Diabetes mellitus, type 2     Hyperlipidemia        Past Surgical History:  Past Surgical History:   Procedure Laterality Date    APPENDECTOMY  1965    HERNIA REPAIR  2000       Family History:  Family History   Problem Relation Age of Onset    Alcohol abuse Mother     Depression Mother     Early death Mother     Diabetes Paternal Uncle     Diabetes Father     Diabetes Paternal Uncle        Social History:  Social History     Socioeconomic History    Marital status:    Tobacco Use    Smoking status: Never    Smokeless tobacco: Never   Substance and Sexual Activity    Alcohol use: Not Currently     Alcohol/week: 1.0 standard drink of alcohol     Types: 1 Cans of beer per week    Drug use: Never    Sexual activity: Not Currently     Partners: Female     Birth control/protection: Abstinence     Comment: Not sexually active       Medications:  Current Outpatient Medications on File Prior to Visit   Medication Sig Dispense Refill    azithromycin (Z-NILDA) 250 MG tablet TAKE 2 TABLETS BY MOUTH FOR 1 DAY THEN TAKE 1 TABLET BY MOUTH DAILY FOR 4 DAYS      clobetasoL (TEMOVATE) 0.05 % external solution Apply topically 2 (two) times daily. 50 mL 2    EScitalopram oxalate (LEXAPRO) 10 MG tablet Take 1 tablet by mouth once daily 90 tablet 3    fenofibrate 160 MG Tab Take 1 tablet by mouth once daily 90 tablet 3    gabapentin (NEURONTIN) 300 MG capsule Take 1 capsule by mouth once daily 90 capsule 0    glucosamine-chondroitin 500-400 mg tablet Take 1 tablet by mouth 3 (three) times daily.      ketoconazole (NIZORAL) 2 % shampoo  "Apply topically twice a week. 120 mL 2    meloxicam (MOBIC) 15 MG tablet TK 1 T PO QAM AFTER A MEAL FOR FOOT PAIN  3    metFORMIN (GLUCOPHAGE) 500 MG tablet Take 1 tablet by mouth once daily 90 tablet 3    pravastatin (PRAVACHOL) 40 MG tablet Take 1 tablet by mouth once daily 90 tablet 1    promethazine-dextromethorphan (PROMETHAZINE-DM) 6.25-15 mg/5 mL Syrp TAKE 10 ML BY MOUTH EVERY 8 HOURS FOR 8 DAYS AS NEEDED FOR CONGESTION OR COUGH       No current facility-administered medications on file prior to visit.       Allergies:  Thimerosal    Immunizations:  Immunization History   Administered Date(s) Administered    COVID-19 Vaccine 09/28/2022    COVID-19, MRNA, LN-S, PF (Pfizer) (Gray Cap) 04/22/2022    COVID-19, MRNA, LN-S, PF (Pfizer) (Purple Cap) 02/25/2021, 03/18/2021, 10/06/2021    COVID-19, mRNA, LNP-S, bivalent booster, PF (Moderna Omicron) 05/18/2023    COVID-19, mRNA, LNP-S, bivalent booster, PF (PFIZER OMICRON) 09/29/2022    Hepatitis A / Hepatitis B 08/22/2023    Influenza 01/01/2021    Influenza (FLUAD) - Quadrivalent - Adjuvanted - PF *Preferred* (65+) 10/07/2021, 08/22/2023    Influenza (FLUAD) - Trivalent - Adjuvanted - PF (65+) 10/25/2022    Influenza - Quadrivalent - PF *Preferred* (6 months and older) 11/06/2018, 12/18/2020    Pneumococcal Conjugate - 13 Valent 12/01/2020, 12/18/2020    Pneumococcal Polysaccharide - 23 Valent 10/07/2021    Tdap 08/05/2021    Zoster Recombinant 10/16/2021, 02/25/2022       Review of Systems:  Review of Systems   All other systems reviewed and are negative.      Objective:    Vitals:  Vitals:    09/05/23 0836   BP: 134/72   Pulse: (!) 57   SpO2: 99%   Weight: 115.8 kg (255 lb 4.7 oz)   Height: 6' 2" (1.88 m)   PainSc: 0-No pain       Physical Exam  Vitals reviewed.   Constitutional:       General: He is not in acute distress.  HENT:      Head: Normocephalic and atraumatic.   Eyes:      Pupils: Pupils are equal, round, and reactive to light.   Cardiovascular:      " Rate and Rhythm: Normal rate and regular rhythm.      Heart sounds: No murmur heard.     No friction rub.   Pulmonary:      Effort: Pulmonary effort is normal.      Breath sounds: Normal breath sounds.   Abdominal:      General: Bowel sounds are normal. There is no distension.      Palpations: Abdomen is soft.      Tenderness: There is no abdominal tenderness.   Genitourinary:     Prostate: Enlarged. Not tender and no nodules present.   Musculoskeletal:      Cervical back: Neck supple.   Skin:     General: Skin is warm and dry.      Findings: No rash.   Psychiatric:         Behavior: Behavior normal.             Ke Power MD  Family Medicine

## 2023-09-30 RX ORDER — ESCITALOPRAM OXALATE 10 MG/1
10 TABLET ORAL DAILY
Qty: 90 TABLET | Refills: 3 | Status: SHIPPED | OUTPATIENT
Start: 2023-09-30

## 2023-09-30 NOTE — TELEPHONE ENCOUNTER
Refill Decision Note   Hank Bookerjai  is requesting a refill authorization.  Brief Assessment and Rationale for Refill:  Approve     Medication Therapy Plan:       Medication Reconciliation Completed: No    Comments:     No Care Gaps recommended.     Note composed:1:49 PM 09/30/2023

## 2023-09-30 NOTE — TELEPHONE ENCOUNTER
No care due was identified.  [facility] Embedded Care Due Messages. Reference number: [id number].   9/30/2023 9:50:33 AM CDT

## 2023-11-21 RX ORDER — GABAPENTIN 300 MG/1
CAPSULE ORAL
Qty: 90 CAPSULE | Refills: 0 | Status: SHIPPED | OUTPATIENT
Start: 2023-11-21 | End: 2024-02-19

## 2023-11-21 NOTE — TELEPHONE ENCOUNTER
Care Due:                  Date            Visit Type   Department     Provider  --------------------------------------------------------------------------------                                MYCHART                              FOLLOWUP/OF  [facility]  Last Visit: 09-      FICE VISIT   MEDICINE       Ke Power                              EP -                              PRIMARY      [facility]  Next Visit: 01-      CARE ([facility])   MEDICINE       Ke Power                                                            Last  Test          Frequency    Reason                     Performed    Due Date  --------------------------------------------------------------------------------    CBC.........  12 months..  fenofibrate..............  01-   01-    CMP.........  12 months..  fenofibrate, metFORMIN,    01-   01-                             pravastatin..............    Lipid Panel.  12 months..  fenofibrate, pravastatin.  01-   01-    [facility] Embedded Care Due Messages. Reference number: [id number].   11/21/2023 10:01:15 AM CST

## 2023-12-15 LAB
LEFT EYE DM RETINOPATHY: NEGATIVE
RIGHT EYE DM RETINOPATHY: NEGATIVE

## 2023-12-16 NOTE — TELEPHONE ENCOUNTER
Care Due:                  Date            Visit Type   Department     Provider  --------------------------------------------------------------------------------                                MYCHART                              FOLLOWUP/OF  [facility]  Last Visit: 09-      FICE VISIT   MEDICINE       Ke Power                              EP -                              PRIMARY      [facility]  Next Visit: 01-      CARE ([facility])   MEDICINE       Ke Power                                                            Last  Test          Frequency    Reason                     Performed    Due Date  --------------------------------------------------------------------------------    HBA1C.......  6 months...  metFORMIN................  09- 03-    [facility] Embedded Care Due Messages. Reference number: [id number].   12/16/2023 11:04:41 AM CST

## 2023-12-17 RX ORDER — PRAVASTATIN SODIUM 40 MG/1
TABLET ORAL
Qty: 90 TABLET | Refills: 0 | Status: SHIPPED | OUTPATIENT
Start: 2023-12-17 | End: 2024-03-13

## 2023-12-17 NOTE — TELEPHONE ENCOUNTER
Refill Decision Note   Hank Montoya  is requesting a refill authorization.  Brief Assessment and Rationale for Refill:  Approve     Medication Therapy Plan:  FLOS      Comments:     Note composed:2:05 AM 12/17/2023

## 2023-12-29 ENCOUNTER — LAB VISIT (OUTPATIENT)
Dept: LAB | Facility: HOSPITAL | Age: 68
End: 2023-12-29
Attending: FAMILY MEDICINE
Payer: MEDICARE

## 2023-12-29 DIAGNOSIS — Z79.899 DRUG THERAPY: ICD-10-CM

## 2023-12-29 DIAGNOSIS — R53.83 FATIGUE, UNSPECIFIED TYPE: ICD-10-CM

## 2023-12-29 DIAGNOSIS — Z12.5 PROSTATE CANCER SCREENING: ICD-10-CM

## 2023-12-29 LAB
ALBUMIN SERPL BCP-MCNC: 3.9 G/DL (ref 3.5–5.2)
ALP SERPL-CCNC: 63 U/L (ref 55–135)
ALT SERPL W/O P-5'-P-CCNC: 30 U/L (ref 10–44)
ANION GAP SERPL CALC-SCNC: 13 MMOL/L (ref 8–16)
AST SERPL-CCNC: 24 U/L (ref 10–40)
BASOPHILS # BLD AUTO: 0.03 K/UL (ref 0–0.2)
BASOPHILS NFR BLD: 0.4 % (ref 0–1.9)
BILIRUB SERPL-MCNC: 0.8 MG/DL (ref 0.1–1)
BUN SERPL-MCNC: 16 MG/DL (ref 8–23)
CALCIUM SERPL-MCNC: 9.6 MG/DL (ref 8.7–10.5)
CHLORIDE SERPL-SCNC: 105 MMOL/L (ref 95–110)
CHOLEST SERPL-MCNC: 179 MG/DL (ref 120–199)
CHOLEST/HDLC SERPL: 4 {RATIO} (ref 2–5)
CO2 SERPL-SCNC: 25 MMOL/L (ref 23–29)
COMPLEXED PSA SERPL-MCNC: 3.1 NG/ML (ref 0–4)
CREAT SERPL-MCNC: 1.1 MG/DL (ref 0.5–1.4)
CRP SERPL-MCNC: 1.6 MG/L (ref 0–8.2)
DIFFERENTIAL METHOD BLD: ABNORMAL
EOSINOPHIL # BLD AUTO: 0.1 K/UL (ref 0–0.5)
EOSINOPHIL NFR BLD: 1.7 % (ref 0–8)
ERYTHROCYTE [DISTWIDTH] IN BLOOD BY AUTOMATED COUNT: 12.5 % (ref 11.5–14.5)
ERYTHROCYTE [SEDIMENTATION RATE] IN BLOOD BY PHOTOMETRIC METHOD: 22 MM/HR (ref 0–23)
EST. GFR  (NO RACE VARIABLE): >60 ML/MIN/1.73 M^2
ESTIMATED AVG GLUCOSE: 137 MG/DL (ref 68–131)
GLUCOSE SERPL-MCNC: 133 MG/DL (ref 70–110)
HBA1C MFR BLD: 6.4 % (ref 4–5.6)
HCT VFR BLD AUTO: 48 % (ref 40–54)
HDLC SERPL-MCNC: 45 MG/DL (ref 40–75)
HDLC SERPL: 25.1 % (ref 20–50)
HGB BLD-MCNC: 16.4 G/DL (ref 14–18)
IMM GRANULOCYTES # BLD AUTO: 0.03 K/UL (ref 0–0.04)
IMM GRANULOCYTES NFR BLD AUTO: 0.4 % (ref 0–0.5)
IRON SERPL-MCNC: 114 UG/DL (ref 45–160)
LDLC SERPL CALC-MCNC: 88.4 MG/DL (ref 63–159)
LYMPHOCYTES # BLD AUTO: 2 K/UL (ref 1–4.8)
LYMPHOCYTES NFR BLD: 25.6 % (ref 18–48)
MCH RBC QN AUTO: 33.9 PG (ref 27–31)
MCHC RBC AUTO-ENTMCNC: 34.2 G/DL (ref 32–36)
MCV RBC AUTO: 99 FL (ref 82–98)
MONOCYTES # BLD AUTO: 0.7 K/UL (ref 0.3–1)
MONOCYTES NFR BLD: 9.4 % (ref 4–15)
NEUTROPHILS # BLD AUTO: 4.8 K/UL (ref 1.8–7.7)
NEUTROPHILS NFR BLD: 62.5 % (ref 38–73)
NONHDLC SERPL-MCNC: 134 MG/DL
NRBC BLD-RTO: 0 /100 WBC
PLATELET # BLD AUTO: 193 K/UL (ref 150–450)
PMV BLD AUTO: 11.6 FL (ref 9.2–12.9)
POTASSIUM SERPL-SCNC: 4.2 MMOL/L (ref 3.5–5.1)
PROT SERPL-MCNC: 7.9 G/DL (ref 6–8.4)
RBC # BLD AUTO: 4.84 M/UL (ref 4.6–6.2)
SATURATED IRON: 36 % (ref 20–50)
SODIUM SERPL-SCNC: 143 MMOL/L (ref 136–145)
TESTOST SERPL-MCNC: 286 NG/DL (ref 304–1227)
TOTAL IRON BINDING CAPACITY: 321 UG/DL (ref 250–450)
TRANSFERRIN SERPL-MCNC: 217 MG/DL (ref 200–375)
TRIGL SERPL-MCNC: 228 MG/DL (ref 30–150)
TSH SERPL DL<=0.005 MIU/L-ACNC: 2.9 UIU/ML (ref 0.4–4)
VIT B12 SERPL-MCNC: 466 PG/ML (ref 210–950)
WBC # BLD AUTO: 7.65 K/UL (ref 3.9–12.7)

## 2023-12-29 PROCEDURE — 82607 VITAMIN B-12: CPT | Performed by: FAMILY MEDICINE

## 2023-12-29 PROCEDURE — 84403 ASSAY OF TOTAL TESTOSTERONE: CPT | Performed by: FAMILY MEDICINE

## 2023-12-29 PROCEDURE — 85025 COMPLETE CBC W/AUTO DIFF WBC: CPT | Performed by: FAMILY MEDICINE

## 2023-12-29 PROCEDURE — 80053 COMPREHEN METABOLIC PANEL: CPT | Performed by: FAMILY MEDICINE

## 2023-12-29 PROCEDURE — 83921 ORGANIC ACID SINGLE QUANT: CPT | Performed by: FAMILY MEDICINE

## 2023-12-29 PROCEDURE — 80061 LIPID PANEL: CPT | Performed by: FAMILY MEDICINE

## 2023-12-29 PROCEDURE — 83036 HEMOGLOBIN GLYCOSYLATED A1C: CPT | Performed by: FAMILY MEDICINE

## 2023-12-29 PROCEDURE — 84153 ASSAY OF PSA TOTAL: CPT | Performed by: FAMILY MEDICINE

## 2023-12-29 PROCEDURE — 85652 RBC SED RATE AUTOMATED: CPT | Performed by: FAMILY MEDICINE

## 2023-12-29 PROCEDURE — 84443 ASSAY THYROID STIM HORMONE: CPT | Performed by: FAMILY MEDICINE

## 2023-12-29 PROCEDURE — 86140 C-REACTIVE PROTEIN: CPT | Performed by: FAMILY MEDICINE

## 2023-12-29 PROCEDURE — 86038 ANTINUCLEAR ANTIBODIES: CPT | Performed by: FAMILY MEDICINE

## 2023-12-29 PROCEDURE — 83540 ASSAY OF IRON: CPT | Performed by: FAMILY MEDICINE

## 2023-12-29 RX ORDER — FENOFIBRATE 160 MG/1
TABLET ORAL
Qty: 90 TABLET | Refills: 0 | Status: SHIPPED | OUTPATIENT
Start: 2023-12-29 | End: 2024-03-28

## 2023-12-29 NOTE — TELEPHONE ENCOUNTER
No care due was identified.  [facility] Embedded Care Due Messages. Reference number: [id number].   12/29/2023 2:16:24 PM CST

## 2023-12-29 NOTE — TELEPHONE ENCOUNTER
Refill Decision Note   Hank Montoya  is requesting a refill authorization.  Brief Assessment and Rationale for Refill:  Approve     Medication Therapy Plan:         Comments:     Note composed:2:32 PM 12/29/2023

## 2024-01-02 LAB — ANA SER QL IF: NORMAL

## 2024-01-03 LAB — METHYLMALONATE SERPL-SCNC: 0.18 UMOL/L

## 2024-01-11 DIAGNOSIS — Z00.00 ENCOUNTER FOR MEDICARE ANNUAL WELLNESS EXAM: ICD-10-CM

## 2024-01-23 ENCOUNTER — OFFICE VISIT (OUTPATIENT)
Dept: FAMILY MEDICINE | Facility: CLINIC | Age: 69
End: 2024-01-23
Payer: MEDICARE

## 2024-01-23 VITALS
WEIGHT: 262.44 LBS | OXYGEN SATURATION: 97 % | HEART RATE: 54 BPM | HEIGHT: 74 IN | SYSTOLIC BLOOD PRESSURE: 132 MMHG | BODY MASS INDEX: 33.68 KG/M2 | DIASTOLIC BLOOD PRESSURE: 80 MMHG

## 2024-01-23 DIAGNOSIS — R53.83 FATIGUE, UNSPECIFIED TYPE: ICD-10-CM

## 2024-01-23 DIAGNOSIS — E78.5 DYSLIPIDEMIA: Primary | ICD-10-CM

## 2024-01-23 DIAGNOSIS — E11.42 TYPE 2 DIABETES MELLITUS WITH DIABETIC POLYNEUROPATHY, WITHOUT LONG-TERM CURRENT USE OF INSULIN: ICD-10-CM

## 2024-01-23 DIAGNOSIS — Z23 IMMUNIZATION DUE: ICD-10-CM

## 2024-01-23 DIAGNOSIS — I10 PRIMARY HYPERTENSION: ICD-10-CM

## 2024-01-23 DIAGNOSIS — I35.1 NONRHEUMATIC AORTIC (VALVE) INSUFFICIENCY: ICD-10-CM

## 2024-01-23 DIAGNOSIS — I70.0 AORTIC ATHEROSCLEROSIS: ICD-10-CM

## 2024-01-23 DIAGNOSIS — F32.A DEPRESSION, UNSPECIFIED DEPRESSION TYPE: ICD-10-CM

## 2024-01-23 DIAGNOSIS — R80.9 PROTEINURIA, UNSPECIFIED TYPE: ICD-10-CM

## 2024-01-23 DIAGNOSIS — R31.9 HEMATURIA, UNSPECIFIED TYPE: ICD-10-CM

## 2024-01-23 DIAGNOSIS — G47.19 OTHER HYPERSOMNIA: ICD-10-CM

## 2024-01-23 PROCEDURE — 99999 PR PBB SHADOW E&M-EST. PATIENT-LVL III: CPT | Mod: PBBFAC,,, | Performed by: FAMILY MEDICINE

## 2024-01-23 PROCEDURE — 99213 OFFICE O/P EST LOW 20 MIN: CPT | Mod: PBBFAC,PN | Performed by: FAMILY MEDICINE

## 2024-01-23 PROCEDURE — 99214 OFFICE O/P EST MOD 30 MIN: CPT | Mod: S$PBB,,, | Performed by: FAMILY MEDICINE

## 2024-01-23 NOTE — PROGRESS NOTES
THIS DOCUMENT WAS MADE IN PART WITH VOICE RECOGNITION SOFTWARE.  OCCASIONALLY THIS SOFTWARE WILL MISINTERPRET WORDS OR PHRASES.    Assessment and Plan:    1. Dyslipidemia        2. Type 2 diabetes mellitus with diabetic polyneuropathy, without long-term current use of insulin        3. Depression, unspecified depression type        4. Nonrheumatic aortic (valve) insufficiency        5. Primary hypertension        6. Immunization due        7. Aortic atherosclerosis        8. Hematuria, unspecified type  Cytology, Urine    Urinalysis Microscopic    Urinalysis, Reflex to Urine Culture Urine, Clean Catch    Protein / creatinine ratio, urine      9. Proteinuria, unspecified type  Cytology, Urine    Urinalysis Microscopic    Urinalysis, Reflex to Urine Culture Urine, Clean Catch    Protein / creatinine ratio, urine      10. Fatigue, unspecified type  Home Sleep Study      11. Other hypersomnia  Home Sleep Study        Fatigue-consider sleep apnea   Home sleep study   Follow-up in 2 months  Consider testosterone replacement therapy    Recheck urinalysis/cytology for hematuria, proteinuria  Low threshold for urology referral     Other chronic conditions appear to be well controlled     Foot exam at next visit      ______________________________________________________________________  Subjective:    Chief Complaint:  Chief Complaint   Patient presents with    Follow-up        HPI:  Hank is a 68 y.o. year old     UA : Protein + RBC x 6  No LUTS    Fatigue / frequent naps   Noted to have low T   No morning time fatigue   Does snore   No prior assessments for sleep apnea        Dyslipidemia, aortic atherosclerosis  Rx-pravastatin 10 mg, fenofibrate 160 mg  Cardiology - Dr. Tom   Denies exertional chest symptoms, fairly active with yard work       Type 2 diabetes mellitus  Rx-Glucophage 500 mg  Previous A1c 6.4%  Currently on statin drug     Depression  Rx-Lexapro 10 mg  Participated in counseling  Medication working well       Bilateral Femoral Lateral Cutaneous Neuropathy  Gabapentin 300 + 100 mg daily, controls symptoms well      ? History of Histoplasmosis Infection   Lung / Eye   Dx : about 20 - 25 years ago   Initially : eye started bleeding   Reports recent abnormal PFT; possibly related to scarring from infection   Denies any SOB / Wheeze           Past Medical History:  Past Medical History:   Diagnosis Date    Depression     Diabetes mellitus, type 2     Hyperlipidemia        Past Surgical History:  Past Surgical History:   Procedure Laterality Date    APPENDECTOMY  1965    HERNIA REPAIR  2000       Family History:  Family History   Problem Relation Age of Onset    Alcohol abuse Mother     Depression Mother     Early death Mother     Diabetes Paternal Uncle     Diabetes Father     Diabetes Paternal Uncle        Social History:  Social History     Socioeconomic History    Marital status:    Tobacco Use    Smoking status: Never    Smokeless tobacco: Never   Substance and Sexual Activity    Alcohol use: Not Currently     Alcohol/week: 1.0 standard drink of alcohol     Types: 1 Cans of beer per week    Drug use: Never    Sexual activity: Not Currently     Partners: Female     Birth control/protection: Abstinence     Comment: Not sexually active       Medications:  Current Outpatient Medications on File Prior to Visit   Medication Sig Dispense Refill    EScitalopram oxalate (LEXAPRO) 10 MG tablet Take 1 tablet (10 mg total) by mouth once daily. 90 tablet 3    fenofibrate 160 MG Tab Take 1 tablet by mouth once daily 90 tablet 0    gabapentin (NEURONTIN) 300 MG capsule Take 1 capsule by mouth once daily 90 capsule 0    glucosamine-chondroitin 500-400 mg tablet Take 1 tablet by mouth 3 (three) times daily.      ketoconazole (NIZORAL) 2 % shampoo Apply topically twice a week. 120 mL 2    metFORMIN (GLUCOPHAGE) 500 MG tablet Take 1 tablet by mouth once daily 90 tablet 3    pravastatin (PRAVACHOL) 40 MG tablet Take 1 tablet by  mouth once daily 90 tablet 0    clobetasoL (TEMOVATE) 0.05 % external solution Apply topically 2 (two) times daily. (Patient not taking: Reported on 1/23/2024) 50 mL 2    meloxicam (MOBIC) 15 MG tablet TK 1 T PO QAM AFTER A MEAL FOR FOOT PAIN  3    tamsulosin (FLOMAX) 0.4 mg Cap Take 1 capsule (0.4 mg total) by mouth once daily. (Patient not taking: Reported on 1/23/2024) 30 capsule 11    [DISCONTINUED] azithromycin (Z-NILDA) 250 MG tablet TAKE 2 TABLETS BY MOUTH FOR 1 DAY THEN TAKE 1 TABLET BY MOUTH DAILY FOR 4 DAYS      [DISCONTINUED] promethazine-dextromethorphan (PROMETHAZINE-DM) 6.25-15 mg/5 mL Syrp TAKE 10 ML BY MOUTH EVERY 8 HOURS FOR 8 DAYS AS NEEDED FOR CONGESTION OR COUGH       No current facility-administered medications on file prior to visit.       Allergies:  Thimerosal    Immunizations:  Immunization History   Administered Date(s) Administered    COVID-19 Vaccine 09/28/2022    COVID-19, MRNA, LN-S, PF (Pfizer) (Gray Cap) 04/22/2022    COVID-19, MRNA, LN-S, PF (Pfizer) (Purple Cap) 02/25/2021, 03/18/2021, 10/06/2021    COVID-19, mRNA, LNP-S, PF, thao-sucrose, 30 mcg/0.3 mL (Pfizer 2023 Ages 12+) 12/20/2023    COVID-19, mRNA, LNP-S, bivalent booster, PF (Moderna Omicron)12 + YEARS 05/18/2023, 05/18/2023    COVID-19, mRNA, LNP-S, bivalent booster, PF (PFIZER OMICRON) 09/29/2022    Hepatitis A / Hepatitis B 08/22/2023    Influenza 01/01/2021    Influenza (FLUAD) - Quadrivalent - Adjuvanted - PF *Preferred* (65+) 10/07/2021, 08/22/2023    Influenza (FLUAD) - Trivalent - Adjuvanted - PF (65+) 10/25/2022    Influenza - Quadrivalent - PF *Preferred* (6 months and older) 11/06/2018, 12/18/2020    Pneumococcal Conjugate - 13 Valent 12/01/2020, 12/18/2020    Pneumococcal Polysaccharide - 23 Valent 10/07/2021    Tdap 08/05/2021    Zoster Recombinant 10/16/2021, 02/25/2022       Review of Systems:  Review of Systems   All other systems reviewed and are negative.      Objective:    Vitals:  Vitals:    01/23/24 2575  "  BP: 132/80   Pulse: (!) 54   SpO2: 97%   Weight: 119 kg (262 lb 7.3 oz)   Height: 6' 2" (1.88 m)   PainSc: 0-No pain       Physical Exam  Constitutional:       General: He is not in acute distress.  HENT:      Head: Normocephalic and atraumatic.   Eyes:      Pupils: Pupils are equal, round, and reactive to light.   Cardiovascular:      Rate and Rhythm: Normal rate and regular rhythm.      Heart sounds: No murmur heard.     No friction rub.   Pulmonary:      Effort: Pulmonary effort is normal.      Breath sounds: Normal breath sounds.   Abdominal:      General: Bowel sounds are normal. There is no distension.      Palpations: Abdomen is soft.      Tenderness: There is no abdominal tenderness.   Musculoskeletal:      Cervical back: Neck supple.   Skin:     General: Skin is warm and dry.      Findings: No rash.   Psychiatric:         Behavior: Behavior normal.           Ke Power MD  Family Medicine      "

## 2024-01-24 ENCOUNTER — TELEPHONE (OUTPATIENT)
Dept: FAMILY MEDICINE | Facility: CLINIC | Age: 69
End: 2024-01-24
Payer: MEDICARE

## 2024-01-24 ENCOUNTER — PATIENT MESSAGE (OUTPATIENT)
Dept: FAMILY MEDICINE | Facility: CLINIC | Age: 69
End: 2024-01-24
Payer: MEDICARE

## 2024-01-24 ENCOUNTER — PATIENT OUTREACH (OUTPATIENT)
Dept: ADMINISTRATIVE | Facility: HOSPITAL | Age: 69
End: 2024-01-24
Payer: MEDICARE

## 2024-01-24 DIAGNOSIS — R31.29 MICROSCOPIC HEMATURIA: Primary | ICD-10-CM

## 2024-01-24 RX ORDER — CIPROFLOXACIN 500 MG/1
500 TABLET ORAL 2 TIMES DAILY
Qty: 20 TABLET | Refills: 0 | Status: SHIPPED | OUTPATIENT
Start: 2024-01-24 | End: 2024-03-25

## 2024-01-24 NOTE — LETTER
AUTHORIZATION FOR RELEASE OF   CONFIDENTIAL INFORMATION    Dear Isaias Vázquez MD,    We are seeing Hank Montoya, date of birth 1955, in the clinic at [facility]. Ke Power MD is the patient's PCP. Hank Montoya has an outstanding lab/procedure at the time we reviewed his chart. In order to help keep his health information updated, he has authorized us to request the following medical record(s):        (  )  MAMMOGRAM                                      (  )  COLONOSCOPY      (  )  PAP SMEAR                                          (  )  OUTSIDE LAB RESULTS     (  )  DEXA SCAN                                          (X)  DIABETIC EYE EXAM            (  )  FOOT EXAM                                          (  )  ENTIRE RECORD     (  )  OUTSIDE IMMUNIZATIONS                 (  )  _______________         Please fax records to [facility], Pray, Zachary C., MD, [phone]    If you have any questions, please contact Stephen Michele LPN Care Coordinator  at [phone].            Patient Name: Hank Montoya  : 1955  Patient Phone #: [phone]                             Hank Montoya  MRN: [id number]  : 1955  Age: 68 y.o.  Sex: male         Patient/Legal Guardian Signature  This signature was collected at 2023           _______________________________   Printed Name/Relationship to Patient      Consent for Examination and Treatment: I hereby authorize the providers and employees of [facility] ([facility]) to provide medical treatment/services which includes, but is not limited to, performing and administering tests and diagnostic procedures that are deemed necessary, including, but not limited to, imaging examinations, blood tests and other laboratory procedures as may be required by the hospital, clinic, or may be ordered by my physician(s) or persons working under the general and/or special instructions of my physician(s).      I understand and agree  that this consent covers all authorized persons, including but not limited to physicians, residents, nurse practitioners, physicians' assistants, specialists, consultants, student nurses, and independently contracted physicians, who are called upon by the physician in charge, to carry out the diagnostic procedures and medical or surgical treatment.     I hereby authorize [facility] to retain or dispose of any specimens or tissue, should there be such remaining from any test or procedure.     I hereby authorize and give consent for [facility] providers and employees to take photographs, images or videotapes of such diagnostic, surgical or treatment procedures of Patient as may be required by [facility] or as may be ordered by a physician. I further acknowledge and agree that [facility] may use cameras or other devices for patient monitoring.     I am aware that the practice of medicine is not an exact science, and I acknowledge that no guarantees have been made to me as to the outcome of any tests, procedures or treatment.     Authorization for Release of Information: I understand that my insurance company and/or their agents may need information necessary to make determinations about payment/reimbursement. I hereby provide authorization to release to all insurance companies, their successors, assignees, other parties with whom they may have contracted, or others acting on their behalf, that are involved with payment for any hospital and/or clinic charges incurred by the patient, any information that they request and deem necessary for payment/reimbursement, and/or quality review.  I further authorize the release of my health information to physicians or other health care practitioners on staff who are involved in my health care now and in the future, and to other health care providers, entities, or institutions for the purpose of my continued care and treatment, including referrals.     REGISTRATION AUTHORIZATION  Form No.  20225 (Rev. 7/13/2022)       Medicare Patient's Certification and Authorization to Release Information and Payment Request:  I certify that the information given by me in applying for payment under Title XVIII of the Social Security Act is correct. I authorize any rviera of medical or other information about me to release to the Social SecurityAdministration, or its intermediaries or carriers, any information needed for this or a related Medicare claim. I request that payment of authorized benefits be made on my behalf.     Assignment of Insurance Benefits:   I hereby authorize any and all insurance companies, health plans, defined   benefit plans, health insurers or any entity that is or may be responsible for payment of my medical expenses to pay all hospital and medical benefits now due, and to become due and payable to me under any hospital benefits, sick benefits, injury benefits or any other benefit for services rendered to me, including Major Medical Benefits, direct to [facility] and all independently contracted physicians. I assign any and all rights that I may have against any and all insurance companies, health plans, defined benefit plans, health insurers or any entity that is or may be responsible for payment of my medical expenses, including, but not limited to any right to appeal a denial of a claim, any right to bring any action, lawsuit, administrative proceeding, or other cause of action on my behalf. I specifically assign my right to pursue litigation against any and all insurance companies, health plans, defined benefit plans, health insurers or any entity that is or may be responsible for payment of my medical expenses based upon a refusal to pay charges.            E. Valuables: It is understood and agreed that [facility] is not liable for the damage to or loss of any money, jewelry,   documents, dentures, eye glasses, hearing aids, prosthetics, or other property of value.     F. Computer Equipment:  I understand and agree that should I choose to use computer equipment owned by [facility] or if I choose to access the Internet via [facility]s network, I do so at my own risk. [facility] is not responsible for any damage to my computer equipment or to any damages of any type that might arise from my loss of equipment or data.     G. Acceptance of Financial Responsibility:  I agree that in consideration of the services and   supplies that have been   or will be furnished to the patient, I am hereby obligated to pay all charges made for or on the account of the patient according to the standard rates (in effect at the time the services and supplies are delivered) established by [facility], including its Patient Financial Assistance Policy to the extent it is applicable. I understand that I am responsible for all charges, or portions thereof, not covered by insurance or other sources. Patient refunds will be distributed only after balances at all [facility] facilities are paid.     H. Communication Authorization:  I hereby authorize [facility] and its representatives, along with any billing service   or  who may work on their behalf, to contact me on   my cell phone and/or home phone using pre- recorded messages, artificial voice messages, automatic telephone dialing devices or other computer assisted technology, or by electronic      mail, text messaging, or by any other form of electronic communication. This includes, but is not limited to, appointment reminders, yearly physical exam reminders, preventive care reminders, patient campaigns, welcome calls, and calls about account balances on my account or any account on which I am listed as a guarantor. I understand I have the right to opt out of these communications at any time.      Relationship  Between  Facility and  Provider:      I understand that some, but not all, providers furnishing services to the patient are not employees or agents of [facility]. The patient  is under the care and supervision of his/her attending physician, and it is the responsibility of the facility and its nursing staff to carry out the instructions of such physicians. It is the responsibility of the patient's physician/designee to obtain the patient's informed consent, when required, for medical or surgical treatment, special diagnostic or therapeutic procedures, or hospital services rendered for the patient under the special instructions of the physician/designee.     REGISTRATION AUTHORIZATION  Form No. 45226 (Rev. 7/13/2022)      Notice of Privacy Practices: I acknowledge I have received a copy of [facility]'s Notice of Privacy Practices.     Facility  Directory: I have discussed with the organization my desire to be either included or excluded  in the facility directory in the event of my being an inpatient at an [facility] facility. I understand that if my choice is to opt-out of being identified in the facility directory that the facility will not provide any information about me such as my condition (e.g. fair, stable, etc.) or my location in the facility (e.g., room number, department).     Immunizations: [facility] shares immunization information with state sponsored health departments to help you and your doctor keep track of your immunization records. By signing, you consent to have this information shared with the health department in your state:                                [place] - LINKS ([place] Immunization Network for Kids Statewide)                                [place] - MIIX ([place] Immunization Information eXchange)                                [place] - ImmPRINT (Immunization Patient Registry with Integrated Technology)     TERM: This authorization is valid for this and subsequent care/treatment I receive at [facility] and will remain valid unless/until revoked in writing by me.     [facility]: As used in this document, [facility] means all [facility] owned  and managed facilities, including, but not limited to, all health centers, surgery centers, clinics, urgent care centers, and hospitals.         [facility] complies with applicable Federal civil rights laws and does not discriminate on the basis of race, color, national origin, age, disability, or sex.  ATENCIÓN: si habla theoañol, tiene a persaud disposición servicios gratuitos de asistencia lingüística. Bryon al [phone].  CHÚ Ý: N?u b?n nói Ti?ng Vi?t, có các d?ch v? h? tr? ngôn ng? mi?n phí dành cho b?n. G?i s? [phone].        REGISTRATION AUTHORIZATION  Form No. 53109 (Rev. 7/13/2022)

## 2024-01-24 NOTE — TELEPHONE ENCOUNTER
----- Message from Ke Power MD sent at 1/24/2024  7:24 AM CST -----  Call patient, reschedule urinalysis after he completes antibiotic therapy, should be on about 10 days of antibiotics

## 2024-01-24 NOTE — PROGRESS NOTES
Population Health Chart Review & Patient Outreach Details    Outreach Performed: NO    Additional Pop Health Notes:           Updates Requested / Reviewed:      Updated Care Coordination Note, , and Immunizations Reconciliation Completed or Queried: [place]         Health Maintenance Topics Overdue:    Health Maintenance Due   Topic Date Due    RSV Vaccine (Age 60+ and Pregnant patients) (1 - 1-dose 60+ series) Never done    Eye Exam  12/20/2023    Foot Exam  03/08/2024         Health Maintenance Topic(s) Outreach Outcomes & Actions Taken:    Eye Exam - Outreach Outcomes & Actions Taken  : External Records Requested & Care Team Updated if Applicable

## 2024-01-26 ENCOUNTER — PATIENT OUTREACH (OUTPATIENT)
Dept: ADMINISTRATIVE | Facility: HOSPITAL | Age: 69
End: 2024-01-26
Payer: MEDICARE

## 2024-01-26 NOTE — PROGRESS NOTES
Population Health Chart Review & Patient Outreach Details    Outreach Performed: NO    Additional Pop Health Notes:           Updates Requested / Reviewed:      Updated Care Coordination Note         Health Maintenance Topics Overdue:    Health Maintenance Due   Topic Date Due    RSV Vaccine (Age 60+ and Pregnant patients) (1 - 1-dose 60+ series) Never done    Eye Exam  12/20/2023    Foot Exam  03/08/2024         Health Maintenance Topic(s) Outreach Outcomes & Actions Taken:    Eye Exam - Outreach Outcomes & Actions Taken  : Diabetic Eye External Records Uploaded, Care Team & History Updated if Applicable

## 2024-02-07 ENCOUNTER — PATIENT MESSAGE (OUTPATIENT)
Dept: FAMILY MEDICINE | Facility: CLINIC | Age: 69
End: 2024-02-07
Payer: MEDICARE

## 2024-02-17 NOTE — TELEPHONE ENCOUNTER
No care due was identified.  Health [facility] Embedded Care Due Messages. Reference number: [id number].   2/17/2024 10:29:34 AM CST

## 2024-02-19 ENCOUNTER — PATIENT MESSAGE (OUTPATIENT)
Dept: FAMILY MEDICINE | Facility: CLINIC | Age: 69
End: 2024-02-19
Payer: MEDICARE

## 2024-02-19 RX ORDER — GABAPENTIN 300 MG/1
CAPSULE ORAL
Qty: 90 CAPSULE | Refills: 0 | Status: SHIPPED | OUTPATIENT
Start: 2024-02-19 | End: 2024-05-14

## 2024-02-23 ENCOUNTER — LAB VISIT (OUTPATIENT)
Dept: LAB | Facility: HOSPITAL | Age: 69
End: 2024-02-23
Attending: FAMILY MEDICINE
Payer: MEDICARE

## 2024-02-23 DIAGNOSIS — R31.29 MICROSCOPIC HEMATURIA: ICD-10-CM

## 2024-02-23 LAB
BILIRUB UR QL STRIP: NEGATIVE
CLARITY UR REFRACT.AUTO: CLEAR
COLOR UR AUTO: YELLOW
GLUCOSE UR QL STRIP: NEGATIVE
HGB UR QL STRIP: NEGATIVE
KETONES UR QL STRIP: NEGATIVE
LEUKOCYTE ESTERASE UR QL STRIP: NEGATIVE
MICROSCOPIC COMMENT: NORMAL
NITRITE UR QL STRIP: NEGATIVE
PH UR STRIP: 6 [PH] (ref 5–8)
PROT UR QL STRIP: ABNORMAL
SP GR UR STRIP: 1.02 (ref 1–1.03)
SQUAMOUS #/AREA URNS AUTO: 1 /HPF
URN SPEC COLLECT METH UR: ABNORMAL

## 2024-02-23 PROCEDURE — 81001 URINALYSIS AUTO W/SCOPE: CPT | Performed by: FAMILY MEDICINE

## 2024-03-13 RX ORDER — PRAVASTATIN SODIUM 40 MG/1
40 TABLET ORAL DAILY
Qty: 90 TABLET | Refills: 3 | Status: SHIPPED | OUTPATIENT
Start: 2024-03-13

## 2024-03-13 NOTE — TELEPHONE ENCOUNTER
Refill Decision Note   Hank Montoya  is requesting a refill authorization.  Brief Assessment and Rationale for Refill:  Approve     Medication Therapy Plan:         Comments:     Note composed:3:03 PM 03/13/2024

## 2024-03-13 NOTE — TELEPHONE ENCOUNTER
No care due was identified.  Health [facility] Embedded Care Due Messages. Reference number: [id number].   3/13/2024 10:05:06 AM CDT

## 2024-03-25 ENCOUNTER — OFFICE VISIT (OUTPATIENT)
Dept: FAMILY MEDICINE | Facility: CLINIC | Age: 69
End: 2024-03-25
Payer: MEDICARE

## 2024-03-25 VITALS
WEIGHT: 259.56 LBS | DIASTOLIC BLOOD PRESSURE: 72 MMHG | SYSTOLIC BLOOD PRESSURE: 132 MMHG | RESPIRATION RATE: 94 BRPM | BODY MASS INDEX: 33.31 KG/M2 | HEART RATE: 57 BPM | HEIGHT: 74 IN

## 2024-03-25 DIAGNOSIS — Z12.5 PROSTATE CANCER SCREENING: ICD-10-CM

## 2024-03-25 DIAGNOSIS — I35.1 NONRHEUMATIC AORTIC (VALVE) INSUFFICIENCY: ICD-10-CM

## 2024-03-25 DIAGNOSIS — E11.42 TYPE 2 DIABETES MELLITUS WITH DIABETIC POLYNEUROPATHY, WITHOUT LONG-TERM CURRENT USE OF INSULIN: ICD-10-CM

## 2024-03-25 DIAGNOSIS — Z79.899 DRUG THERAPY: ICD-10-CM

## 2024-03-25 DIAGNOSIS — I10 PRIMARY HYPERTENSION: ICD-10-CM

## 2024-03-25 DIAGNOSIS — F32.A DEPRESSION, UNSPECIFIED DEPRESSION TYPE: Primary | ICD-10-CM

## 2024-03-25 DIAGNOSIS — Z86.19 HISTORY OF HISTOPLASMOSIS: ICD-10-CM

## 2024-03-25 DIAGNOSIS — E78.5 DYSLIPIDEMIA: ICD-10-CM

## 2024-03-25 DIAGNOSIS — I70.0 AORTIC ATHEROSCLEROSIS: ICD-10-CM

## 2024-03-25 DIAGNOSIS — Z23 IMMUNIZATION DUE: ICD-10-CM

## 2024-03-25 PROCEDURE — 99214 OFFICE O/P EST MOD 30 MIN: CPT | Mod: S$PBB,,, | Performed by: FAMILY MEDICINE

## 2024-03-25 PROCEDURE — G2211 COMPLEX E/M VISIT ADD ON: HCPCS | Mod: S$PBB,,, | Performed by: FAMILY MEDICINE

## 2024-03-25 PROCEDURE — 99213 OFFICE O/P EST LOW 20 MIN: CPT | Mod: PBBFAC,PN | Performed by: FAMILY MEDICINE

## 2024-03-25 PROCEDURE — 99999 PR PBB SHADOW E&M-EST. PATIENT-LVL III: CPT | Mod: PBBFAC,,, | Performed by: FAMILY MEDICINE

## 2024-03-25 NOTE — PROGRESS NOTES
THIS DOCUMENT WAS MADE IN PART WITH VOICE RECOGNITION SOFTWARE.  OCCASIONALLY THIS SOFTWARE WILL MISINTERPRET WORDS OR PHRASES.    Assessment and Plan:    1. Depression, unspecified depression type        2. Dyslipidemia        3. Aortic atherosclerosis        4. Nonrheumatic aortic (valve) insufficiency        5. Primary hypertension        6. History of histoplasmosis        7. Type 2 diabetes mellitus with diabetic polyneuropathy, without long-term current use of insulin  Hemoglobin A1C    Comprehensive Metabolic Panel    Foot Exam Performed      8. Immunization due        9. Prostate cancer screening        10. Drug therapy            PLAN    Wellness labs before next visit     Foot exam performed today     Other chronic conditions stable     Counseled patient on sleep apnea testing, he may reach out to the sleep lab to proceed with sleep apnea testing    Visit today included increased complexity associated with the care of the episodic problem noted above addressed and managing the longitudinal care of the patient due to the serious and/or complex managed problem(s) .    ______________________________________________________________________  Subjective:    Chief Complaint:  Chief Complaint   Patient presents with    Follow-up        HPI:  Hank is a 68 y.o. year old     Follow-up fatigue  Ordered home sleep study, testosterone levels at prior visit   Testosterone 286   Home sleep study pending completion     Interval history  Had follow-up lab work for microscopic hematuria, proteinuria   Repeat urine showed trace protein, resolution of hematuria    Htn   Initially elevated, normal on recheck          Dyslipidemia, aortic atherosclerosis  Rx-pravastatin 10 mg, fenofibrate 160 mg  Cardiology - Dr. Tom   Denies exertional chest symptoms, fairly active with yard work       Type 2 diabetes mellitus  Rx-Glucophage 500 mg  Previous A1c 6.4%  Currently on statin drug     Depression  Rx-Lexapro 10 mg  Participated in  counseling  Medication working well      Bilateral Femoral Lateral Cutaneous Neuropathy  Gabapentin 300 + 100 mg daily, controls symptoms well      ? History of Histoplasmosis Infection   Lung / Eye   Dx : about 20 - 25 years ago   Initially : eye started bleeding   Reports recent abnormal PFT; possibly related to scarring from infection   Denies any SOB / Wheeze     Past Medical History:  Past Medical History:   Diagnosis Date    Depression     Diabetes mellitus, type 2     Hyperlipidemia        Past Surgical History:  Past Surgical History:   Procedure Laterality Date    APPENDECTOMY  1965    HERNIA REPAIR  2000       Family History:  Family History   Problem Relation Age of Onset    Alcohol abuse Mother     Depression Mother     Early death Mother     Diabetes Paternal Uncle     Diabetes Father     Diabetes Paternal Uncle        Social History:  Social History     Socioeconomic History    Marital status:    Tobacco Use    Smoking status: Never    Smokeless tobacco: Never   Substance and Sexual Activity    Alcohol use: Not Currently     Alcohol/week: 1.0 standard drink of alcohol     Types: 1 Cans of beer per week    Drug use: Never    Sexual activity: Not Currently     Partners: Female     Birth control/protection: Abstinence     Comment: Not sexually active     Social Determinants of Health     Financial Resource Strain: Low Risk  (3/22/2024)    Overall Financial Resource Strain (CARDIA)     Difficulty of Paying Living Expenses: Not hard at all   Food Insecurity: No Food Insecurity (3/22/2024)    Hunger Vital Sign     Worried About Running Out of Food in the Last Year: Never true     Ran Out of Food in the Last Year: Never true   Transportation Needs: No Transportation Needs (3/22/2024)    PRAPARE - Transportation     Lack of Transportation (Medical): No     Lack of Transportation (Non-Medical): No   Physical Activity: Insufficiently Active (3/22/2024)    Exercise Vital Sign     Days of Exercise per Week:  3 days     Minutes of Exercise per Session: 30 min   Stress: No Stress Concern Present (3/22/2024)    [employer] - Occupational Stress Questionnaire     Feeling of Stress : Not at all   Social Connections: Unknown (3/22/2024)    Social Connection and Isolation Panel [NHANES]     Frequency of Communication with Friends and Family: More than three times a week     Frequency of Social Gatherings with Friends and Family: Once a week     Active Member of Clubs or Organizations: Yes     Attends Club or Organization Meetings: More than 4 times per year     Marital Status:    Housing Stability: Low Risk  (3/22/2024)    Housing Stability Vital Sign     Unable to Pay for Housing in the Last Year: No     Number of Places Lived in the Last Year: 1     Unstable Housing in the Last Year: No       Medications:  Current Outpatient Medications on File Prior to Visit   Medication Sig Dispense Refill    EScitalopram oxalate (LEXAPRO) 10 MG tablet Take 1 tablet (10 mg total) by mouth once daily. 90 tablet 3    fenofibrate 160 MG Tab Take 1 tablet by mouth once daily 90 tablet 0    gabapentin (NEURONTIN) 300 MG capsule Take 1 capsule by mouth once daily 90 capsule 0    glucosamine-chondroitin 500-400 mg tablet Take 1 tablet by mouth 3 (three) times daily.      ketoconazole (NIZORAL) 2 % shampoo Apply topically twice a week. 120 mL 2    metFORMIN (GLUCOPHAGE) 500 MG tablet Take 1 tablet by mouth once daily 90 tablet 3    pravastatin (PRAVACHOL) 40 MG tablet Take 1 tablet (40 mg total) by mouth once daily. 90 tablet 3    [DISCONTINUED] ciprofloxacin HCl (CIPRO) 500 MG tablet Take 1 tablet (500 mg total) by mouth 2 (two) times daily. 20 tablet 0    [DISCONTINUED] meloxicam (MOBIC) 15 MG tablet TK 1 T PO QAM AFTER A MEAL FOR FOOT PAIN  3     No current facility-administered medications on file prior to visit.       Allergies:  Thimerosal    Immunizations:  Immunization History   Administered Date(s)  "Administered    COVID-19 Vaccine 09/28/2022    COVID-19, MRNA, LN-S, PF (Pfizer) (Gray Cap) 04/22/2022    COVID-19, MRNA, LN-S, PF (Pfizer) (Purple Cap) 02/25/2021, 03/18/2021, 10/06/2021    COVID-19, mRNA, LNP-S, PF, thao-sucrose, 30 mcg/0.3 mL (Pfizer 2023 Ages 12+) 12/20/2023    COVID-19, mRNA, LNP-S, bivalent booster, PF (Moderna Omicron)12 + YEARS 05/18/2023, 05/18/2023    COVID-19, mRNA, LNP-S, bivalent booster, PF (PFIZER OMICRON) 09/29/2022    Hepatitis A / Hepatitis B 08/22/2023    Influenza 01/01/2021    Influenza (FLUAD) - Quadrivalent - Adjuvanted - PF *Preferred* (65+) 10/07/2021, 08/22/2023    Influenza (FLUAD) - Trivalent - Adjuvanted - PF (65+) 10/25/2022    Influenza - Quadrivalent - PF *Preferred* (6 months and older) 11/06/2018, 12/18/2020    Pneumococcal Conjugate - 13 Valent 12/01/2020, 12/18/2020    Pneumococcal Polysaccharide - 23 Valent 10/07/2021    Tdap 08/05/2021    Zoster Recombinant 10/16/2021, 02/25/2022       Review of Systems:  Review of Systems   All other systems reviewed and are negative.      Objective:    Vitals:  Vitals:    03/25/24 1134   Weight: 117.8 kg (259 lb 9.5 oz)   Height: 6' 2" (1.88 m)   PainSc: 0-No pain       Physical Exam  Vitals reviewed.   Constitutional:       General: He is not in acute distress.  HENT:      Head: Normocephalic and atraumatic.   Eyes:      Pupils: Pupils are equal, round, and reactive to light.   Cardiovascular:      Rate and Rhythm: Normal rate and regular rhythm.      Pulses:           Dorsalis pedis pulses are 2+ on the right side and 2+ on the left side.        Posterior tibial pulses are 2+ on the right side and 2+ on the left side.      Heart sounds: No murmur heard.     No friction rub.   Pulmonary:      Effort: Pulmonary effort is normal.      Breath sounds: Normal breath sounds.   Abdominal:      General: Bowel sounds are normal. There is no distension.      Palpations: Abdomen is soft.      Tenderness: There is no abdominal " tenderness.   Musculoskeletal:      Cervical back: Neck supple.   Feet:      Right foot:      Protective Sensation: 4 sites tested.  4 sites sensed.      Skin integrity: Skin integrity normal.      Left foot:      Protective Sensation: 4 sites tested.  3 sites sensed.      Skin integrity: Skin integrity normal.   Skin:     General: Skin is warm and dry.      Findings: No rash.   Psychiatric:         Behavior: Behavior normal.             Ke Power MD  Family Medicine

## 2024-03-27 NOTE — TELEPHONE ENCOUNTER
No care due was identified.  [facility] Embedded Care Due Messages. Reference number: [id number].   3/27/2024 4:48:04 PM CDT

## 2024-03-28 RX ORDER — FENOFIBRATE 160 MG/1
TABLET ORAL
Qty: 90 TABLET | Refills: 3 | Status: SHIPPED | OUTPATIENT
Start: 2024-03-28

## 2024-03-28 NOTE — TELEPHONE ENCOUNTER
Refill Decision Note   Hank Montoya  is requesting a refill authorization.  Brief Assessment and Rationale for Refill:  Approve     Medication Therapy Plan:         Comments:     Note composed:4:19 AM 03/28/2024

## 2024-05-14 RX ORDER — GABAPENTIN 300 MG/1
CAPSULE ORAL
Qty: 90 CAPSULE | Refills: 0 | Status: SHIPPED | OUTPATIENT
Start: 2024-05-14

## 2024-05-14 NOTE — TELEPHONE ENCOUNTER
Care Due:                  Date            Visit Type   Department     Provider  --------------------------------------------------------------------------------                                EP -                              PRIMARY      [facility] FAMILY  Last Visit: 03-      CARE ([facility])   MEDICINE       Ke Power                              ESTABLISHED   [facility]  Next Visit: 09-      PATIENT      MEDICINE       Ke Power                                                            Last  Test          Frequency    Reason                     Performed    Due Date  --------------------------------------------------------------------------------    HBA1C.......  6 months...  metFORMIN................  12- 06-    [employer] Embedded Care Due Messages. Reference number: [id number].   5/14/2024 9:59:47 AM CDT

## 2024-05-14 NOTE — TELEPHONE ENCOUNTER
Please approve for Gabapentin 300 MG Oral Capsule     Last OV 03/25/24  Last refill date 02/19/24  Last labs 03/25/24    Next appt 09/25/24

## 2024-07-15 ENCOUNTER — PATIENT MESSAGE (OUTPATIENT)
Dept: FAMILY MEDICINE | Facility: CLINIC | Age: 69
End: 2024-07-15
Payer: MEDICARE

## 2024-08-09 RX ORDER — GABAPENTIN 300 MG/1
CAPSULE ORAL
Qty: 90 CAPSULE | Refills: 0 | Status: SHIPPED | OUTPATIENT
Start: 2024-08-09

## 2024-08-22 LAB — CRC RECOMMENDATION EXT: NORMAL

## 2024-09-17 ENCOUNTER — LAB VISIT (OUTPATIENT)
Dept: LAB | Facility: HOSPITAL | Age: 69
End: 2024-09-17
Attending: FAMILY MEDICINE
Payer: MEDICARE

## 2024-09-17 DIAGNOSIS — E11.42 TYPE 2 DIABETES MELLITUS WITH DIABETIC POLYNEUROPATHY, WITHOUT LONG-TERM CURRENT USE OF INSULIN: ICD-10-CM

## 2024-09-17 LAB
ALBUMIN SERPL BCP-MCNC: 3.6 G/DL (ref 3.5–5.2)
ALP SERPL-CCNC: 54 U/L (ref 55–135)
ALT SERPL W/O P-5'-P-CCNC: 24 U/L (ref 10–44)
ANION GAP SERPL CALC-SCNC: 8 MMOL/L (ref 8–16)
AST SERPL-CCNC: 22 U/L (ref 10–40)
BILIRUB SERPL-MCNC: 0.5 MG/DL (ref 0.1–1)
BUN SERPL-MCNC: 16 MG/DL (ref 8–23)
CALCIUM SERPL-MCNC: 9.4 MG/DL (ref 8.7–10.5)
CHLORIDE SERPL-SCNC: 106 MMOL/L (ref 95–110)
CO2 SERPL-SCNC: 24 MMOL/L (ref 23–29)
CREAT SERPL-MCNC: 1 MG/DL (ref 0.5–1.4)
EST. GFR  (NO RACE VARIABLE): >60 ML/MIN/1.73 M^2
ESTIMATED AVG GLUCOSE: 146 MG/DL (ref 68–131)
GLUCOSE SERPL-MCNC: 143 MG/DL (ref 70–110)
HBA1C MFR BLD: 6.7 % (ref 4–5.6)
POTASSIUM SERPL-SCNC: 4.1 MMOL/L (ref 3.5–5.1)
PROT SERPL-MCNC: 7.1 G/DL (ref 6–8.4)
SODIUM SERPL-SCNC: 138 MMOL/L (ref 136–145)

## 2024-09-17 PROCEDURE — 80053 COMPREHEN METABOLIC PANEL: CPT | Performed by: FAMILY MEDICINE

## 2024-09-17 PROCEDURE — 36415 COLL VENOUS BLD VENIPUNCTURE: CPT | Mod: PN | Performed by: FAMILY MEDICINE

## 2024-09-17 PROCEDURE — 83036 HEMOGLOBIN GLYCOSYLATED A1C: CPT | Performed by: FAMILY MEDICINE

## 2024-09-18 ENCOUNTER — PATIENT OUTREACH (OUTPATIENT)
Dept: ADMINISTRATIVE | Facility: HOSPITAL | Age: 69
End: 2024-09-18
Payer: MEDICARE

## 2024-09-18 DIAGNOSIS — E11.42 TYPE 2 DIABETES MELLITUS WITH DIABETIC POLYNEUROPATHY, WITHOUT LONG-TERM CURRENT USE OF INSULIN: ICD-10-CM

## 2024-09-18 NOTE — TELEPHONE ENCOUNTER
No care due was identified.  [facility] Embedded Care Due Messages. Reference number: [id number].   9/18/2024 5:29:07 PM CDT

## 2024-09-18 NOTE — PROGRESS NOTES
Population Health Chart Review & Patient Outreach Details      Additional [facility] Notes:               Updates Requested / Reviewed:      Updated Care Coordination Note, Care Everywhere, and Immunizations Reconciliation Completed or Queried: [facility] Topics Overdue:      [facility] Score: 1     Urine Screening    Influenza Vaccine  RSV Vaccine                  Health Maintenance Topic(s) Outreach Outcomes & Actions Taken:    Colorectal Cancer Screening - Outreach Outcomes & Actions Taken  : External Records Uploaded, Care Team Updated, & History Updated if Applicable

## 2024-09-19 RX ORDER — ESCITALOPRAM OXALATE 10 MG/1
10 TABLET ORAL
Qty: 90 TABLET | Refills: 1 | Status: SHIPPED | OUTPATIENT
Start: 2024-09-19

## 2024-09-19 RX ORDER — METFORMIN HYDROCHLORIDE 500 MG/1
TABLET ORAL
Qty: 90 TABLET | Refills: 1 | Status: SHIPPED | OUTPATIENT
Start: 2024-09-19

## 2024-09-19 NOTE — TELEPHONE ENCOUNTER
Refill Decision Note   Hank Montoya  is requesting a refill authorization.  Brief Assessment and Rationale for Refill:  Approve     Medication Therapy Plan:         Comments:     Note composed:1:36 PM 09/19/2024

## 2024-09-25 ENCOUNTER — PATIENT MESSAGE (OUTPATIENT)
Dept: FAMILY MEDICINE | Facility: CLINIC | Age: 69
End: 2024-09-25

## 2024-09-25 ENCOUNTER — OFFICE VISIT (OUTPATIENT)
Dept: FAMILY MEDICINE | Facility: CLINIC | Age: 69
End: 2024-09-25
Payer: MEDICARE

## 2024-09-25 VITALS
HEART RATE: 60 BPM | WEIGHT: 259.69 LBS | DIASTOLIC BLOOD PRESSURE: 82 MMHG | BODY MASS INDEX: 33.33 KG/M2 | SYSTOLIC BLOOD PRESSURE: 138 MMHG | HEIGHT: 74 IN

## 2024-09-25 DIAGNOSIS — I10 PRIMARY HYPERTENSION: ICD-10-CM

## 2024-09-25 DIAGNOSIS — Z23 IMMUNIZATION DUE: Primary | ICD-10-CM

## 2024-09-25 DIAGNOSIS — E78.5 DYSLIPIDEMIA: ICD-10-CM

## 2024-09-25 DIAGNOSIS — Z12.5 PROSTATE CANCER SCREENING: ICD-10-CM

## 2024-09-25 DIAGNOSIS — I70.0 AORTIC ATHEROSCLEROSIS: ICD-10-CM

## 2024-09-25 DIAGNOSIS — E11.42 TYPE 2 DIABETES MELLITUS WITH DIABETIC POLYNEUROPATHY, WITHOUT LONG-TERM CURRENT USE OF INSULIN: ICD-10-CM

## 2024-09-25 PROCEDURE — 99999 PR PBB SHADOW E&M-EST. PATIENT-LVL IV: CPT | Mod: PBBFAC,,, | Performed by: FAMILY MEDICINE

## 2024-09-25 PROCEDURE — 99214 OFFICE O/P EST MOD 30 MIN: CPT | Mod: PBBFAC,PN | Performed by: FAMILY MEDICINE

## 2024-09-25 RX ORDER — SEMAGLUTIDE 1.34 MG/ML
1 INJECTION, SOLUTION SUBCUTANEOUS
Qty: 3 ML | Refills: 11 | Status: SHIPPED | OUTPATIENT
Start: 2024-10-25 | End: 2025-10-25

## 2024-09-25 RX ORDER — SEMAGLUTIDE 0.68 MG/ML
INJECTION, SOLUTION SUBCUTANEOUS
Qty: 3 ML | Refills: 0 | Status: SHIPPED | OUTPATIENT
Start: 2024-09-25 | End: 2024-10-23

## 2024-09-25 NOTE — PROGRESS NOTES
THIS DOCUMENT WAS MADE IN PART WITH VOICE RECOGNITION SOFTWARE.  OCCASIONALLY THIS SOFTWARE WILL MISINTERPRET WORDS OR PHRASES.    Assessment and Plan:    1. Immunization due        2. Type 2 diabetes mellitus with diabetic polyneuropathy, without long-term current use of insulin  semaglutide (OZEMPIC) 0.25 mg or 0.5 mg (2 mg/3 mL) pen injector    semaglutide (OZEMPIC) 1 mg/dose (4 mg/3 mL)    HEMOGLOBIN A1C    Comprehensive Metabolic Panel    Lipid Panel    Urinalysis, Reflex to Urine Culture Urine, Clean Catch    Urinalysis Microscopic    Microalbumin/Creatinine Ratio, Urine      3. Primary hypertension        4. Dyslipidemia        5. Aortic atherosclerosis        6. Prostate cancer screening  PSA, Screening            PLAN    Discontinue metformin, new medicine Ozempic for better A1c control on weight loss benefit     Labs as above before next visit     Other chronic conditions appear to be stable     Counseled on RSV, influenza COVID vaccines and counseled on eye exam    Visit today included increased complexity associated with the care of the episodic problem noted above addressed and managing the longitudinal care of the patient due to the serious and/or complex managed problem(s) .    ______________________________________________________________________  Subjective:    Chief Complaint:  Chief Complaint   Patient presents with    Follow-up        HPI:  Hank is a 69 y.o. year old     History of Present Illness    CHIEF COMPLAINT:  Chief Complaint    DIABETES MANAGEMENT:  He reports an increase in A1C from 6.4 to 6.7, attributing this rise to dietary factors and a weight gain of approximately 5-6 lbs since the last lab draw. He expresses willingness to make dietary changes to improve diabetes management. He is currently taking Metformin (Glucophage) and is considering switching to Ozempic, a weekly injectable medication, due to its potential for weight loss. He has been informed about possible side effects,  "including nausea, constipation, and bloating.    DEPRESSION:  He reports current use of Lexapro for depression management. He recently experienced the loss of his pet dog, which has been emotionally challenging. He notes feeling "flatter" due to decreased physical activity, attributing this to the recent loss and hot weather limiting outdoor exercise. Despite these challenges, he states he is "okay" and managing his depression.    OTHER MEDICAL CONDITIONS:  He continues to take gabapentin 300 mg nightly. He has a history of histoplasmosis but denies any current lung or eye symptoms related to this condition.    PREVENTIVE CARE:  He is due for RSV vaccine and plans to get the updated COVID vaccine. He intends to get a flu vaccine in the near future, understanding that he can receive RSV and flu vaccines together. He is aware of the need for an annual diabetic eye exam and reports having a regular ophthalmologist on the [place]. His last diabetic eye exam was in December of the previous year.    SOCIAL HISTORY:  He has begun volunteering for [employer] as a way to stay active and engaged. He engages in outdoor activities and takes precautions against mosquitoes, wearing white shirts and long pants when outdoors. He expresses interest in local outdoor events and activities for children.    SURGICAL HISTORY:  He reports a history of ACL tear approximately 5 months ago, which occurred during a soccer-related incident.    FAMILY HISTORY:  His wife is described as health-conscious, encouraging him to eat healthier.      ROS:  General: -fever, -chills, -fatigue, -weight gain, -weight loss  Eyes: -vision changes, -redness, -discharge, -eye pain  ENT: -ear pain, -nasal congestion, -sore throat  Cardiovascular: -chest pain, -palpitations, -lower extremity edema  Respiratory: -cough, -shortness of breath  Gastrointestinal: -abdominal pain, -nausea, -vomiting, -diarrhea, -constipation, -blood in " stool  Genitourinary: -dysuria, -hematuria, -frequency  Musculoskeletal: -joint pain, -muscle pain  Skin: -rash, -lesion  Neurological: -headache, -dizziness, -numbness, -tingling  Psychiatric: -anxiety, +depression, -sleep difficulty                 Dyslipidemia, aortic atherosclerosis  Rx-pravastatin 10 mg, fenofibrate 160 mg  Cardiology - Dr. Tom ---> MD Adriana    Denies exertional chest symptoms, fairly active with yard work       Type 2 diabetes mellitus  Rx-Glucophage 500 mg  Previous A1c 6.7%  Currently on statin drug     Depression  Rx-Lexapro 10 mg  Participated in counseling  Medication working well      Bilateral Femoral Lateral Cutaneous Neuropathy  Gabapentin 300 mg daily, controls symptoms well      ? History of Histoplasmosis Infection   Lung / Eye   Dx : about 20 - 25 years ago   Initially : eye started bleeding   Reports recent abnormal PFT; possibly related to scarring from infection   Denies any SOB / Wheeze     Past Medical History:  Past Medical History:   Diagnosis Date    Depression     Diabetes mellitus, type 2     Hyperlipidemia        Past Surgical History:  Past Surgical History:   Procedure Laterality Date    APPENDECTOMY  1965    HERNIA REPAIR  2000       Family History:  Family History   Problem Relation Name Age of Onset    Alcohol abuse Mother Harini Pandey     Depression Mother Harini Pandey     Early death Mother Harini Pandey     Diabetes Paternal Uncle Jersey Montoya     Diabetes Father Arcenio Montoya     Diabetes Paternal Uncle Felipe Montoya        Social History:  Social History     Socioeconomic History    Marital status:    Tobacco Use    Smoking status: Never    Smokeless tobacco: Never   Substance and Sexual Activity    Alcohol use: Not Currently     Alcohol/week: 1.0 standard drink of alcohol     Types: 1 Cans of beer per week    Drug use: Never    Sexual activity: Not Currently     Partners: Female     Birth control/protection: Abstinence     Comment: Not  sexually active     Social Determinants of Health     Financial Resource Strain: Low Risk  (3/22/2024)    Overall Financial Resource Strain (CARDIA)     Difficulty of Paying Living Expenses: Not hard at all   Food Insecurity: No Food Insecurity (3/22/2024)    Hunger Vital Sign     Worried About Running Out of Food in the Last Year: Never true     Ran Out of Food in the Last Year: Never true   Transportation Needs: No Transportation Needs (3/22/2024)    PRAPARE - Transportation     Lack of Transportation (Medical): No     Lack of Transportation (Non-Medical): No   Physical Activity: Insufficiently Active (3/22/2024)    Exercise Vital Sign     Days of Exercise per Week: 3 days     Minutes of Exercise per Session: 30 min   Stress: No Stress Concern Present (3/22/2024)    [employer] - Occupational Stress Questionnaire     Feeling of Stress : Not at all   Housing Stability: Low Risk  (3/22/2024)    Housing Stability Vital Sign     Unable to Pay for Housing in the Last Year: No     Number of Places Lived in the Last Year: 1     Unstable Housing in the Last Year: No       Medications:  Current Outpatient Medications on File Prior to Visit   Medication Sig Dispense Refill    EScitalopram oxalate (LEXAPRO) 10 MG tablet Take 1 tablet by mouth once daily 90 tablet 1    fenofibrate 160 MG Tab Take 1 tablet by mouth once daily 90 tablet 3    gabapentin (NEURONTIN) 300 MG capsule Take 1 capsule by mouth once daily 90 capsule 0    glucosamine-chondroitin 500-400 mg tablet Take 1 tablet by mouth once daily.      ketoconazole (NIZORAL) 2 % shampoo Apply topically twice a week. 120 mL 2    pravastatin (PRAVACHOL) 40 MG tablet Take 1 tablet (40 mg total) by mouth once daily. 90 tablet 3    [DISCONTINUED] metFORMIN (GLUCOPHAGE) 500 MG tablet Take 1 tablet by mouth once daily 90 tablet 1     No current facility-administered medications on file prior to visit.  "      Allergies:  Thimerosal    Immunizations:  Immunization History   Administered Date(s) Administered    COVID-19 Vaccine 09/28/2022    COVID-19, MRNA, LN-S, PF (Pfizer) (Gray Cap) 04/22/2022    COVID-19, MRNA, LN-S, PF (Pfizer) (Purple Cap) 02/25/2021, 03/18/2021, 10/06/2021    COVID-19, mRNA, LNP-S, PF, thao-sucrose, 30 mcg/0.3 mL (Pfizer 2023 Ages 12+) 12/20/2023    COVID-19, mRNA, LNP-S, bivalent booster, PF (Moderna Omicron)12 + YEARS 05/18/2023, 05/18/2023    COVID-19, mRNA, LNP-S, bivalent booster, PF (PFIZER OMICRON) 09/29/2022    Hepatitis A / Hepatitis B 08/22/2023    Influenza 01/01/2021    Influenza (FLUAD) - Quadrivalent - Adjuvanted - PF *Preferred* (65+) 10/07/2021, 08/22/2023    Influenza - Quadrivalent - PF *Preferred* (6 months and older) 11/06/2018, 12/18/2020    Influenza - Trivalent - Afluria, Fluzone MDV 09/01/2022    Influenza - Trivalent - Fluad - Adjuvanted - PF (65 years and older 10/25/2022    Pneumococcal Conjugate - 13 Valent 12/01/2020, 12/18/2020    Pneumococcal Polysaccharide - 23 Valent 10/07/2021, 10/01/2022    Tdap 08/05/2021    Zoster Recombinant 10/16/2021, 02/25/2022       Review of Systems:  Review of Systems   Constitutional:  Negative for activity change.   HENT:  Negative for hearing loss and trouble swallowing.    Eyes:  Negative for discharge.   Respiratory:  Negative for chest tightness and wheezing.    Cardiovascular:  Negative for chest pain and palpitations.   Gastrointestinal:  Negative for constipation, diarrhea and vomiting.   Genitourinary:  Negative for difficulty urinating and hematuria.   Neurological:  Negative for headaches.   Psychiatric/Behavioral:  Negative for dysphoric mood.    All other systems reviewed and are negative.      Objective:    Vitals:  Vitals:    09/25/24 0929   BP: 138/82   Pulse: 60   Weight: 117.8 kg (259 lb 11.2 oz)   Height: 6' 2" (1.88 m)   PainSc: 0-No pain       Physical Exam  Vitals reviewed.   Constitutional:       General: He " is not in acute distress.  HENT:      Head: Normocephalic and atraumatic.   Eyes:      Pupils: Pupils are equal, round, and reactive to light.   Cardiovascular:      Rate and Rhythm: Normal rate and regular rhythm.      Heart sounds: No murmur heard.     No friction rub.   Pulmonary:      Effort: Pulmonary effort is normal.      Breath sounds: Normal breath sounds.   Abdominal:      General: Bowel sounds are normal. There is no distension.      Palpations: Abdomen is soft.      Tenderness: There is no abdominal tenderness.   Musculoskeletal:      Cervical back: Neck supple.   Skin:     General: Skin is warm and dry.      Findings: No rash.   Psychiatric:         Behavior: Behavior normal.             Ke Power MD  Family Medicine

## 2024-11-04 RX ORDER — GABAPENTIN 300 MG/1
CAPSULE ORAL
Qty: 90 CAPSULE | Refills: 0 | Status: SHIPPED | OUTPATIENT
Start: 2024-11-04

## 2025-01-24 ENCOUNTER — LAB VISIT (OUTPATIENT)
Dept: LAB | Facility: HOSPITAL | Age: 70
End: 2025-01-24
Attending: FAMILY MEDICINE
Payer: MEDICARE

## 2025-01-24 DIAGNOSIS — Z12.5 PROSTATE CANCER SCREENING: ICD-10-CM

## 2025-01-24 DIAGNOSIS — E11.42 TYPE 2 DIABETES MELLITUS WITH DIABETIC POLYNEUROPATHY, WITHOUT LONG-TERM CURRENT USE OF INSULIN: ICD-10-CM

## 2025-01-24 LAB
BILIRUB UR QL STRIP: NEGATIVE
CLARITY UR REFRACT.AUTO: CLEAR
COLOR UR AUTO: YELLOW
GLUCOSE UR QL STRIP: NEGATIVE
HGB UR QL STRIP: NEGATIVE
KETONES UR QL STRIP: NEGATIVE
LEUKOCYTE ESTERASE UR QL STRIP: NEGATIVE
MICROSCOPIC COMMENT: NORMAL
NITRITE UR QL STRIP: NEGATIVE
PH UR STRIP: 6 [PH] (ref 5–8)
PROT UR QL STRIP: ABNORMAL
RBC #/AREA URNS AUTO: 4 /HPF (ref 0–4)
SP GR UR STRIP: 1.02 (ref 1–1.03)
SQUAMOUS #/AREA URNS AUTO: 0 /HPF
UNSPECIFIED CRY UR QL COMP ASSIST: <1
URN SPEC COLLECT METH UR: ABNORMAL
WBC #/AREA URNS AUTO: 5 /HPF (ref 0–5)

## 2025-01-24 PROCEDURE — 81001 URINALYSIS AUTO W/SCOPE: CPT | Performed by: FAMILY MEDICINE

## 2025-01-24 PROCEDURE — 82043 UR ALBUMIN QUANTITATIVE: CPT | Performed by: FAMILY MEDICINE

## 2025-01-24 PROCEDURE — 80053 COMPREHEN METABOLIC PANEL: CPT | Performed by: FAMILY MEDICINE

## 2025-01-24 PROCEDURE — 83036 HEMOGLOBIN GLYCOSYLATED A1C: CPT | Performed by: FAMILY MEDICINE

## 2025-01-24 PROCEDURE — 84153 ASSAY OF PSA TOTAL: CPT | Performed by: FAMILY MEDICINE

## 2025-01-24 PROCEDURE — 80061 LIPID PANEL: CPT | Performed by: FAMILY MEDICINE

## 2025-01-25 LAB
ALBUMIN SERPL BCP-MCNC: 3.9 G/DL (ref 3.5–5.2)
ALBUMIN/CREAT UR: 68.5 UG/MG (ref 0–30)
ALP SERPL-CCNC: 58 U/L (ref 40–150)
ALT SERPL W/O P-5'-P-CCNC: 29 U/L (ref 10–44)
ANION GAP SERPL CALC-SCNC: 10 MMOL/L (ref 8–16)
AST SERPL-CCNC: 26 U/L (ref 10–40)
BILIRUB SERPL-MCNC: 0.5 MG/DL (ref 0.1–1)
BUN SERPL-MCNC: 19 MG/DL (ref 8–23)
CALCIUM SERPL-MCNC: 9.9 MG/DL (ref 8.7–10.5)
CHLORIDE SERPL-SCNC: 104 MMOL/L (ref 95–110)
CHOLEST SERPL-MCNC: 180 MG/DL (ref 120–199)
CHOLEST/HDLC SERPL: 4.5 {RATIO} (ref 2–5)
CO2 SERPL-SCNC: 25 MMOL/L (ref 23–29)
COMPLEXED PSA SERPL-MCNC: 4.1 NG/ML (ref 0–4)
CREAT SERPL-MCNC: 1.1 MG/DL (ref 0.5–1.4)
CREAT UR-MCNC: 143 MG/DL (ref 23–375)
EST. GFR  (NO RACE VARIABLE): >60 ML/MIN/1.73 M^2
ESTIMATED AVG GLUCOSE: 154 MG/DL (ref 68–131)
GLUCOSE SERPL-MCNC: 137 MG/DL (ref 70–110)
HBA1C MFR BLD: 7 % (ref 4–5.6)
HDLC SERPL-MCNC: 40 MG/DL (ref 40–75)
HDLC SERPL: 22.2 % (ref 20–50)
LDLC SERPL CALC-MCNC: 82.8 MG/DL (ref 63–159)
MICROALBUMIN UR DL<=1MG/L-MCNC: 98 UG/ML
NONHDLC SERPL-MCNC: 140 MG/DL
POTASSIUM SERPL-SCNC: 5.1 MMOL/L (ref 3.5–5.1)
PROT SERPL-MCNC: 8 G/DL (ref 6–8.4)
SODIUM SERPL-SCNC: 139 MMOL/L (ref 136–145)
TRIGL SERPL-MCNC: 286 MG/DL (ref 30–150)

## 2025-01-29 NOTE — TELEPHONE ENCOUNTER
Refill Routing Note   Medication(s) are not appropriate for processing by [facility] for the following reason(s):        Outside of protocol    [facility] action(s):  Route   Requires labs : Yes               Appointments  past 12m or future 3m with PCP    Date Provider   Last Visit   9/25/2024 Ke Power MD   Next Visit   2/7/2025 Ke Power MD   ED visits in past 90 days: 0        Note composed:4:51 PM 01/29/2025

## 2025-01-29 NOTE — TELEPHONE ENCOUNTER
Care Due:                  Date            Visit Type   Department     Provider  --------------------------------------------------------------------------------                                ESTABLISHED   [facility]  Last Visit: 09-      PATIENT      MEDICINE       Ke Power                              ESTABLISHED   [facility]  Next Visit: 02-      PATIENT      MEDICINE       [facility]                                                            Last  Test          Frequency    Reason                     Performed    Due Date  --------------------------------------------------------------------------------    CBC.........  12 months..  fenofibrate..............  12- 12-    [facility] Embedded Care Due Messages. Reference number: [id number].   1/29/2025 4:17:22 PM CST

## 2025-01-30 RX ORDER — GABAPENTIN 300 MG/1
CAPSULE ORAL
Qty: 90 CAPSULE | Refills: 3 | Status: SHIPPED | OUTPATIENT
Start: 2025-01-30

## 2025-02-07 ENCOUNTER — OFFICE VISIT (OUTPATIENT)
Dept: FAMILY MEDICINE | Facility: CLINIC | Age: 70
End: 2025-02-07
Payer: MEDICARE

## 2025-02-07 VITALS
RESPIRATION RATE: 16 BRPM | BODY MASS INDEX: 33.4 KG/M2 | HEIGHT: 74 IN | WEIGHT: 260.25 LBS | OXYGEN SATURATION: 96 % | DIASTOLIC BLOOD PRESSURE: 78 MMHG | HEART RATE: 58 BPM | SYSTOLIC BLOOD PRESSURE: 136 MMHG

## 2025-02-07 DIAGNOSIS — R97.20 PSA ELEVATION: ICD-10-CM

## 2025-02-07 DIAGNOSIS — E11.42 TYPE 2 DIABETES MELLITUS WITH DIABETIC POLYNEUROPATHY, WITHOUT LONG-TERM CURRENT USE OF INSULIN: Primary | ICD-10-CM

## 2025-02-07 DIAGNOSIS — I70.0 AORTIC ATHEROSCLEROSIS: ICD-10-CM

## 2025-02-07 DIAGNOSIS — G57.10 LATERAL FEMORAL CUTANEOUS NEUROPATHY, UNSPECIFIED LATERALITY: ICD-10-CM

## 2025-02-07 PROCEDURE — 99214 OFFICE O/P EST MOD 30 MIN: CPT | Mod: S$PBB,,, | Performed by: FAMILY MEDICINE

## 2025-02-07 PROCEDURE — 99999 PR PBB SHADOW E&M-EST. PATIENT-LVL III: CPT | Mod: PBBFAC,,, | Performed by: FAMILY MEDICINE

## 2025-02-07 PROCEDURE — 99213 OFFICE O/P EST LOW 20 MIN: CPT | Mod: PBBFAC,PN | Performed by: FAMILY MEDICINE

## 2025-02-07 PROCEDURE — G2211 COMPLEX E/M VISIT ADD ON: HCPCS | Mod: S$PBB,,, | Performed by: FAMILY MEDICINE

## 2025-02-07 RX ORDER — METFORMIN HYDROCHLORIDE 1000 MG/1
1000 TABLET ORAL 2 TIMES DAILY WITH MEALS
Qty: 180 TABLET | Refills: 3 | Status: SHIPPED | OUTPATIENT
Start: 2025-02-07 | End: 2026-02-07

## 2025-02-07 RX ORDER — METFORMIN HYDROCHLORIDE 500 MG/1
500 TABLET ORAL
COMMUNITY
End: 2025-02-07

## 2025-02-07 RX ORDER — GABAPENTIN 100 MG/1
100-200 CAPSULE ORAL DAILY PRN
Start: 2025-02-07 | End: 2026-02-07

## 2025-02-07 NOTE — PROGRESS NOTES
THIS DOCUMENT WAS MADE IN PART WITH VOICE RECOGNITION SOFTWARE.  OCCASIONALLY THIS SOFTWARE WILL MISINTERPRET WORDS OR PHRASES.    Assessment and Plan:    1. Type 2 diabetes mellitus with diabetic polyneuropathy, without long-term current use of insulin  metFORMIN (GLUCOPHAGE) 1000 MG tablet    Hemoglobin A1C      2. Aortic atherosclerosis        3. PSA elevation  PSA, TOTAL AND FREE      4. Lateral femoral cutaneous neuropathy, unspecified laterality  gabapentin (NEURONTIN) 100 MG capsule              PLAN    Assessment & Plan    PLAN SUMMARY:   Order PSA test in 3 months   Order A1C test in 3 months   Increase metformin to 1000 mg twice daily   Decrease Lexapro to 5 mg daily for 1 month, then discontinue if tolerated   Update gabapentin prescription to 100-200 mg as needed   Continue fenofibrate at current dose   Restart fish oil supplements   Increase fiber intake   Follow-up in 3 months for PSA and A1C testing   Follow-up in 6 months for return visit    DIABETES:   Explained A1C measurement process and timeline for seeing changes with medication adjustments.   Increased metformin from 500 mg daily to 1000 mg twice daily.   Ordered A1C for repeat testing in 3 months.   Noted patient's A1C increased to 7% during the holidays.   Evaluated glucose level, which is elevated as expected for a diabetic.   Reviewed medicine list and discussed increasing metformin dose.   Sent prescription for higher dose metformin to pharmacy.   Scheduled follow up in 3 months for A1C testing and in 6 months for return visit.    PROSTATE CANCER SCREENING:   Discussed PSA trends and prostate cancer screening, emphasizing that most men die with prostate cancer, not from it.   Noted PSA level increased from 3.1 to 4.1.   Evaluated PSA trend and determined no suspicion of prostate cancer at this time.   Ordered PSA for repeat testing in 3 months.   Scheduled follow up in 3 months for PSA testing and in 6 months for return  visit.    MICROALBUMINURIA:   Clarified microalbuminuria implications and its relationship to potential future kidney disease.   Detected trace amount of protein in urine test with urine microalbumin ratio of 68.5.   Evaluated microalbuminuria, likely due to history of diabetes.   Noted kidney function by blood test is normal.   Explained potential for kidney disease development if protein in urine increases.   Planned to monitor microalbumin annually.    HYPERTRIGLYCERIDEMIA:   Educated the patient on the impact of fish oil supplements on triglyceride levels.   Advised the patient to restart fish oil supplements to help lower triglyceride levels.   Continued fenofibrate at current dose.   Noted triglycerides have increased to the highest level in the last three measurements.   Evaluated LDL and HDL levels, which are stable, with HDL showing improvement.   Assessed that no immediate change in treatment is needed based on current triglyceride levels.   Recommend increasing fiber intake to help lower triglyceride levels.    DEPRESSION:   Decreased Lexapro from 10 mg to 5 mg daily for 1 month, then discontinue if tolerated well.   Instructed the patient to contact the office if depression symptoms worsen after reducing or stopping Lexapro.   Noted the patient has been taking Lexapro 10 mg for 2 decades.   Discussed potential benefits and risks of reducing or stopping Lexapro.   Advised to resume full dose if depression symptoms return.    NEUROPATHY:   Updated gabapentin prescription to 100-200 mg as needed, to be filled after current 300 mg   supply is finished.   Instructed the patient to contact the office if neuropathy symptoms return after reducing gabapentin.   Noted the patient reports symptoms have mostly disappeared.   Discussed potential benefits of reducing gabapentin dose.    HYPERTENSION:   Monitored blood pressure, which was initially high during check-in but improved on recheck.   Evaluated current blood  pressure at 134/78.   Assessed that current blood pressure is acceptable.           Visit today included increased complexity associated with the care of the episodic problem noted above addressed and managing the longitudinal care of the patient due to the serious and/or complex managed problem(s) .    ______________________________________________________________________  Subjective:    Chief Complaint:  Chief Complaint   Patient presents with    Follow-up        HPI:  Hank is a 69 y.o. year old     History of Present Illness    CHIEF COMPLAINT:  Hank presents today for follow-up of diabetes management    DIABETES:  He reports an increase in A1C through the holiday season. He is currently taking metformin 500 mg daily. He has not started Ozempic as previously discussed due to deductible timing considerations, preferring to potentially initiate it at the start of the new year if metformin proves ineffective.    LABS:  PSA levels have been trending upward, with the most recent value at 4.1 ng/mL, up from previous values of 3.7 ng/mL and 3.1 ng/mL. Trace amounts of protein in urine indicate microalbuminuria. Kidney function tests were normal.    HYPERLIPIDEMIA:  HDL cholesterol levels have improved to normal range, which he attributes to increased exercise efforts. Triglycerides are elevated, consistent with his history. He discontinued fish oil supplements approximately two months ago due to concerns about negative health effects.    MENTAL HEALTH:  He continues Lexapro 10 mg daily for depression, which he has been taking for the past two decades. He expresses uncertainty about discontinuing the medication due to concerns about potential symptom recurrence.    NEUROPATHY:  He takes Gabapentin for femoral lateral cutaneous neuropathy and reports significant improvement in symptoms, noting that the neuropathy has largely resolved.    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      ROS:  ROS as indicated in HPI.                 Dyslipidemia, aortic atherosclerosis  Rx-pravastatin 10 mg, fenofibrate 160 mg  Cardiology - Dr. Tom ---> MD Adriana    Denies exertional chest symptoms, fairly active with yard work       Type 2 diabetes mellitus  Rx-Glucophage 1000 mg BID  Previous A1c 7   Currently on statin drug    Microalbuminuria  Prev m/c ration : 68.5      Depression  Rx-Lexapro 10 mg  Participated in counseling  Medication working well      Bilateral Femoral Lateral Cutaneous Neuropathy  Gabapentin 300 mg daily, controls symptoms well      ? History of Histoplasmosis Infection   Lung / Eye   Dx : about 20 - 25 years ago   Initially : eye started bleeding   Reports recent abnormal PFT; possibly related to scarring from infection   Denies any SOB / Wheeze     Past Medical History:  Past Medical History:   Diagnosis Date    Depression     Diabetes mellitus, type 2     Hyperlipidemia        Past Surgical History:  Past Surgical History:   Procedure Laterality Date    APPENDECTOMY  1965    HERNIA REPAIR  2000       Family History:  Family History   Problem Relation Name Age of Onset    Alcohol abuse Mother Harini Pandey     Depression Mother Harini Pandey     Early death Mother Harini Pandey     Diabetes Paternal Uncle Don Lindsay     Diabetes Father Arcenio Montoya     Diabetes Paternal Uncle Felipe Montoya        Social History:  Social History     Socioeconomic History    Marital status:    Tobacco Use    Smoking status: Never    Smokeless tobacco: Never   Substance and Sexual Activity    Alcohol use: Not Currently     Alcohol/week: 1.0 standard drink of alcohol     Types: 1 Cans of beer per week    Drug use: Never    Sexual activity: Not Currently     Partners: Female     Birth control/protection: Abstinence     Comment: Not sexually active     Social Drivers of Health     Financial Resource Strain: Low Risk  (3/22/2024)    Overall Financial Resource  Strain (CARDIA)     Difficulty of Paying Living Expenses: Not hard at all   Food Insecurity: No Food Insecurity (3/22/2024)    Hunger Vital Sign     Worried About Running Out of Food in the Last Year: Never true     Ran Out of Food in the Last Year: Never true   Transportation Needs: No Transportation Needs (3/22/2024)    PRAPARE - Transportation     Lack of Transportation (Medical): No     Lack of Transportation (Non-Medical): No   Physical Activity: Insufficiently Active (3/22/2024)    Exercise Vital Sign     Days of Exercise per Week: 3 days     Minutes of Exercise per Session: 30 min   Stress: No Stress Concern Present (3/22/2024)    [employer] - Occupational Stress Questionnaire     Feeling of Stress : Not at all   Housing Stability: Low Risk  (3/22/2024)    Housing Stability Vital Sign     Unable to Pay for Housing in the Last Year: No     Number of Places Lived in the Last Year: 1     Unstable Housing in the Last Year: No       Medications:  Current Outpatient Medications on File Prior to Visit   Medication Sig Dispense Refill    EScitalopram oxalate (LEXAPRO) 10 MG tablet Take 1 tablet by mouth once daily 90 tablet 1    fenofibrate 160 MG Tab Take 1 tablet by mouth once daily 90 tablet 3    glucosamine-chondroitin 500-400 mg tablet Take 1 tablet by mouth once daily.      ketoconazole (NIZORAL) 2 % shampoo Apply topically twice a week. 120 mL 2    pravastatin (PRAVACHOL) 40 MG tablet Take 1 tablet (40 mg total) by mouth once daily. 90 tablet 3    [DISCONTINUED] gabapentin (NEURONTIN) 300 MG capsule Take 1 capsule by mouth once daily 90 capsule 3    [DISCONTINUED] metFORMIN (GLUCOPHAGE) 500 MG tablet Take 500 mg by mouth.      [DISCONTINUED] semaglutide (OZEMPIC) 1 mg/dose (4 mg/3 mL) Inject 1 mg into the skin every 7 days. (Patient not taking: Reported on 2/7/2025) 3 mL 11     No current facility-administered medications on file prior to visit.        Allergies:  Thimerosal    Immunizations:  Immunization History   Administered Date(s) Administered    COVID-19 Vaccine 09/28/2022, 09/27/2024    COVID-19, MRNA, LN-S, PF (Pfizer) (Gray Cap) 04/22/2022    COVID-19, MRNA, LN-S, PF (Pfizer) (Purple Cap) 02/25/2021, 03/18/2021, 10/06/2021    COVID-19, mRNA, LNP-S, PF, thao-sucrose, 30 mcg/0.3 mL (Pfizer Ages 12+) 12/20/2023    COVID-19, mRNA, LNP-S, bivalent booster, PF (Moderna Omicron)12 + YEARS 05/18/2023, 05/18/2023    COVID-19, mRNA, LNP-S, bivalent booster, PF (PFIZER OMICRON) 09/29/2022    Hepatitis A / Hepatitis B 08/22/2023    Influenza 01/01/2021    Influenza (FLUAD) - Quadrivalent - Adjuvanted - PF *Preferred* (65+) 10/07/2021, 08/22/2023    Influenza - Quadrivalent - PF *Preferred* (6 months and older) 11/06/2018, 12/18/2020    Influenza - Trivalent - Afluria, Fluzone MDV 09/01/2022    Influenza - Trivalent - Fluad - Adjuvanted - PF (65 years and older 10/25/2022    Pneumococcal Conjugate - 13 Valent 12/01/2020, 12/18/2020    Pneumococcal Polysaccharide - 23 Valent 10/07/2021, 10/01/2022    Tdap 08/05/2021    Zoster Recombinant 10/16/2021, 02/25/2022       Review of Systems:  Review of Systems   Constitutional:  Negative for activity change and unexpected weight change.   HENT:  Negative for hearing loss, rhinorrhea and trouble swallowing.    Eyes:  Negative for discharge and visual disturbance.   Respiratory:  Negative for chest tightness and wheezing.    Cardiovascular:  Negative for chest pain and palpitations.   Gastrointestinal:  Negative for blood in stool, constipation, diarrhea and vomiting.   Endocrine: Negative for polydipsia and polyuria.   Genitourinary:  Negative for difficulty urinating, hematuria and urgency.   Musculoskeletal:  Negative for arthralgias, joint swelling and neck pain.   Neurological:  Negative for weakness and headaches.   Psychiatric/Behavioral:  Negative for confusion and dysphoric mood.    All other systems reviewed  "and are negative.      Objective:    Vitals:  Vitals:    02/07/25 1010   BP: 136/78   Pulse: (!) 58   Resp: 16   SpO2: 96%   Weight: 118.1 kg (260 lb 4.1 oz)   Height: 6' 2" (1.88 m)   PainSc: 0-No pain       Physical Exam  Vitals reviewed.   Constitutional:       General: He is not in acute distress.  HENT:      Head: Normocephalic and atraumatic.   Eyes:      Pupils: Pupils are equal, round, and reactive to light.   Cardiovascular:      Rate and Rhythm: Normal rate and regular rhythm.      Heart sounds: No murmur heard.     No friction rub.   Pulmonary:      Effort: Pulmonary effort is normal.      Breath sounds: Normal breath sounds.   Abdominal:      General: Bowel sounds are normal. There is no distension.      Palpations: Abdomen is soft.      Tenderness: There is no abdominal tenderness.   Musculoskeletal:      Cervical back: Neck supple.   Skin:     General: Skin is warm and dry.      Findings: No rash.   Psychiatric:         Behavior: Behavior normal.             Ke Power MD  Family Medicine          "

## 2025-02-21 DIAGNOSIS — Z00.00 ENCOUNTER FOR MEDICARE ANNUAL WELLNESS EXAM: ICD-10-CM

## 2025-03-03 DIAGNOSIS — M25.562 LEFT KNEE PAIN, UNSPECIFIED CHRONICITY: Primary | ICD-10-CM

## 2025-03-05 ENCOUNTER — OFFICE VISIT (OUTPATIENT)
Dept: ORTHOPEDICS | Facility: CLINIC | Age: 70
End: 2025-03-05
Payer: MEDICARE

## 2025-03-05 ENCOUNTER — HOSPITAL ENCOUNTER (OUTPATIENT)
Dept: RADIOLOGY | Facility: HOSPITAL | Age: 70
Discharge: HOME | End: 2025-03-05
Attending: ORTHOPAEDIC SURGERY
Payer: MEDICARE

## 2025-03-05 DIAGNOSIS — S83.8X2A SPRAIN OF OTHER LIGAMENT OF LEFT KNEE, INITIAL ENCOUNTER: Primary | ICD-10-CM

## 2025-03-05 DIAGNOSIS — M25.562 LEFT KNEE PAIN, UNSPECIFIED CHRONICITY: ICD-10-CM

## 2025-03-05 PROCEDURE — 99999 PR PBB SHADOW E&M-EST. PATIENT-LVL I: CPT | Mod: PBBFAC,,, | Performed by: ORTHOPAEDIC SURGERY

## 2025-03-05 PROCEDURE — 99211 OFF/OP EST MAY X REQ PHY/QHP: CPT | Mod: PBBFAC,25,PO | Performed by: ORTHOPAEDIC SURGERY

## 2025-03-05 PROCEDURE — 73562 X-RAY EXAM OF KNEE 3: CPT | Mod: TC,PO,RT

## 2025-03-05 PROCEDURE — 73562 X-RAY EXAM OF KNEE 3: CPT | Mod: 26,59,RT, | Performed by: RADIOLOGY

## 2025-03-05 PROCEDURE — 73564 X-RAY EXAM KNEE 4 OR MORE: CPT | Mod: 26,LT,, | Performed by: RADIOLOGY

## 2025-03-05 RX ORDER — MELOXICAM 15 MG/1
15 TABLET ORAL DAILY
Qty: 30 TABLET | Refills: 2 | Status: SHIPPED | OUTPATIENT
Start: 2025-03-05

## 2025-03-05 NOTE — PROGRESS NOTES
Past Medical History:   Diagnosis Date    Depression     Diabetes mellitus, type 2     Hyperlipidemia        Past Surgical History:   Procedure Laterality Date    APPENDECTOMY  1965    HERNIA REPAIR  2000       Current Outpatient Medications   Medication Sig    EScitalopram oxalate (LEXAPRO) 10 MG tablet Take 1 tablet by mouth once daily    fenofibrate 160 MG Tab Take 1 tablet by mouth once daily    gabapentin (NEURONTIN) 100 MG capsule Take 1-2 capsules (100-200 mg total) by mouth daily as needed.    glucosamine-chondroitin 500-400 mg tablet Take 1 tablet by mouth once daily.    ketoconazole (NIZORAL) 2 % shampoo Apply topically twice a week.    metFORMIN (GLUCOPHAGE) 1000 MG tablet Take 1 tablet (1,000 mg total) by mouth 2 (two) times daily with meals.    pravastatin (PRAVACHOL) 40 MG tablet Take 1 tablet (40 mg total) by mouth once daily.     No current facility-administered medications for this visit.       Review of patient's allergies indicates:   Allergen Reactions    Thimerosal        Family History   Problem Relation Name Age of Onset    Alcohol abuse Mother Harini Pandey     Depression Mother Harini Pandey     Early death Mother Harini Pandey     Diabetes Paternal Uncle Jersey Montoya     Diabetes Father Arcenio Montoya     Diabetes Paternal Uncle Felipe Montoya        Social History[1]    Chief Complaint: No chief complaint on file.      History of present illness:  69-year-old male seen for left knee pain.  Pain started a few days ago after pushing a .  Has some tenderness to palpation around the front of his knee.  Most of the pain originally was in the back of his knee.  Knee feels like it is a little weak and unstable.  Never swelled.  No mechanical symptoms.  Pain is a 2/10.  Wearing a knee wrap.  Taking naproxen and Tylenol    Prior treatment:  See above        Review of Systems:    Constitution: Negative for chills, fever, and sweats.  Negative for unexplained weight loss.    HENT:   Negative for headaches and blurry vision.    Cardiovascular:Negative for chest pain or irregular heart beat. Negative for hypertension.    Respiratory:  Negative for cough and shortness of breath.    Gastrointestinal: Negative for abdominal pain, heartburn, melena, nausea, and vomitting.    Genitourinary:  Negative bladder incontinence and dysuria.    Musculoskeletal:  See HPI    Neurological: Negative for numbness.    Psychiatric/Behavioral: Negative for depression.  The patient is not nervous/anxious.      Endocrine: Negative for polyuria    Hematologic/Lymphatic: Negative for bleeding problem.  Does not bruise/bleed easily.    Skin: Negative for poor would healing and rash      Physical Examination:    Vital Signs:  There were no vitals filed for this visit.    There is no height or weight on file to calculate BMI.    This a well-developed, well nourished patient in no acute distress.  They are alert and oriented and cooperative to examination.  Pt. walks without an antalgic gait.      Examination of the left knee shows no rashes or erythema. There are no masses ecchymosis or effusion. Patient has full range of motion from 0-130°. Patient is very minimal tenderness to palpation over lateral joint line and nontender to palpation over the medial joint line. Patient has a - Lachman exam, - anterior drawer exam, and - posterior drawer exam. - Apley exam. Knee is stable to varus and valgus stress. 5 out of 5 motor strength. Palpable distal pulses. Intact light touch sensation. Negative Patellofemoral crepitus      X-rays:  X-rays of the left knee are ordered and reviewed which show some medial joint space narrowing.  Mild on the left and slightly more moderate on the right.         Assessment:  Left knee sprain    Plan:  I reviewed the findings with him today.  I do not see anything overly concerning.  I recommended continuing theTylenol.  We will get him a true hinged knee brace to help with the knee instability.   Also recommended Mobic 15 milligrams daily            All previous pertinent notes including ER visits, physical therapy visits, other orthopedic visits as well as other care for the same musculoskeletal problem were reviewed.  All pertinent lab values and previous imaging was reviewed pertinent to the current visit.    This note was created using [employer] voice recognition software that occasionally misinterpreted phrases or words.    Consult note is delivered via Epic messaging service.           [1]   Social History  Socioeconomic History    Marital status:    Tobacco Use    Smoking status: Never    Smokeless tobacco: Never   Substance and Sexual Activity    Alcohol use: Not Currently     Alcohol/week: 1.0 standard drink of alcohol     Types: 1 Cans of beer per week    Drug use: Never    Sexual activity: Not Currently     Partners: Female     Birth control/protection: Abstinence     Comment: Not sexually active     Social Drivers of Health     Financial Resource Strain: Low Risk  (3/22/2024)    Overall Financial Resource Strain (CARDIA)     Difficulty of Paying Living Expenses: Not hard at all   Food Insecurity: No Food Insecurity (3/22/2024)    Hunger Vital Sign     Worried About Running Out of Food in the Last Year: Never true     Ran Out of Food in the Last Year: Never true   Transportation Needs: No Transportation Needs (3/22/2024)    PRAPARE - Transportation     Lack of Transportation (Medical): No     Lack of Transportation (Non-Medical): No   Physical Activity: Insufficiently Active (3/22/2024)    Exercise Vital Sign     Days of Exercise per Week: 3 days     Minutes of Exercise per Session: 30 min   Stress: No Stress Concern Present (3/22/2024)    [employer] - Occupational Stress Questionnaire     Feeling of Stress : Not at all   Housing Stability: Low Risk  (3/22/2024)    Housing Stability Vital Sign     Unable to Pay for Housing in the Last Year: No     Number of  Places Lived in the Last Year: 1     Unstable Housing in the Last Year: No

## 2025-03-21 RX ORDER — ESCITALOPRAM OXALATE 10 MG/1
10 TABLET ORAL
Qty: 90 TABLET | Refills: 3 | Status: SHIPPED | OUTPATIENT
Start: 2025-03-21

## 2025-03-21 NOTE — TELEPHONE ENCOUNTER
No care due was identified.  [facility] Embedded Care Due Messages. Reference number: [id number].   3/21/2025 9:03:42 AM CDT

## 2025-03-21 NOTE — TELEPHONE ENCOUNTER
Hank Montoya  is requesting a refill authorization.  Brief Assessment and Rationale for Refill:  Approve     Medication Therapy Plan:         Comments:     Note composed:10:01 AM 03/21/2025

## 2025-05-12 ENCOUNTER — LAB VISIT (OUTPATIENT)
Dept: LAB | Facility: HOSPITAL | Age: 70
End: 2025-05-12
Attending: FAMILY MEDICINE
Payer: MEDICARE

## 2025-05-12 DIAGNOSIS — E11.42 TYPE 2 DIABETES MELLITUS WITH DIABETIC POLYNEUROPATHY, WITHOUT LONG-TERM CURRENT USE OF INSULIN: ICD-10-CM

## 2025-05-12 DIAGNOSIS — R97.20 PSA ELEVATION: ICD-10-CM

## 2025-05-12 LAB
EAG (OHS): 146 MG/DL (ref 68–131)
HBA1C MFR BLD: 6.7 % (ref 4–5.6)
PSA FREE MFR SERPL: 29.95 %
PSA FREE SERPL-MCNC: 1.18 NG/ML
PSA SERPL-MCNC: 3.94 NG/ML

## 2025-05-12 PROCEDURE — 83036 HEMOGLOBIN GLYCOSYLATED A1C: CPT

## 2025-05-12 PROCEDURE — 36415 COLL VENOUS BLD VENIPUNCTURE: CPT | Mod: PN

## 2025-05-12 PROCEDURE — 84153 ASSAY OF PSA TOTAL: CPT

## 2025-05-13 ENCOUNTER — RESULTS FOLLOW-UP (OUTPATIENT)
Dept: FAMILY MEDICINE | Facility: CLINIC | Age: 70
End: 2025-05-13

## 2025-06-06 DIAGNOSIS — G57.10 LATERAL FEMORAL CUTANEOUS NEUROPATHY, UNSPECIFIED LATERALITY: ICD-10-CM

## 2025-06-06 RX ORDER — GABAPENTIN 100 MG/1
100-200 CAPSULE ORAL DAILY PRN
Qty: 60 CAPSULE | Refills: 1 | Status: SHIPPED | OUTPATIENT
Start: 2025-06-06 | End: 2026-06-06

## 2025-07-17 DIAGNOSIS — E78.5 DYSLIPIDEMIA: Primary | ICD-10-CM

## 2025-07-17 RX ORDER — FENOFIBRATE 160 MG/1
160 TABLET ORAL
Qty: 90 TABLET | Refills: 3 | Status: SHIPPED | OUTPATIENT
Start: 2025-07-17

## 2025-07-17 RX ORDER — PRAVASTATIN SODIUM 40 MG/1
40 TABLET ORAL
Qty: 90 TABLET | Refills: 1 | Status: SHIPPED | OUTPATIENT
Start: 2025-07-17

## 2025-07-17 NOTE — TELEPHONE ENCOUNTER
Refill Routing Note   Medication(s) are not appropriate for processing by [facility] for the following reason(s):        Required labs outdated    [facility] action(s):  Approve  Defer               Appointments  past 12m or future 3m with PCP    Date Provider   Last Visit   2/7/2025 Ke Power MD   Next Visit   Visit date not found Ke Power MD   ED visits in past 90 days: 0        Note composed:12:44 PM 07/17/2025

## 2025-07-17 NOTE — TELEPHONE ENCOUNTER
No care due was identified.  [facility] Embedded Care Due Messages. Reference number: [id number].   7/17/2025 11:32:59 AM CDT

## 2025-08-12 DIAGNOSIS — G57.10 LATERAL FEMORAL CUTANEOUS NEUROPATHY, UNSPECIFIED LATERALITY: ICD-10-CM

## 2025-08-14 RX ORDER — GABAPENTIN 100 MG/1
CAPSULE ORAL
Qty: 60 CAPSULE | Refills: 0 | Status: SHIPPED | OUTPATIENT
Start: 2025-08-14